# Patient Record
Sex: MALE | Race: WHITE | NOT HISPANIC OR LATINO | Employment: FULL TIME | URBAN - METROPOLITAN AREA
[De-identification: names, ages, dates, MRNs, and addresses within clinical notes are randomized per-mention and may not be internally consistent; named-entity substitution may affect disease eponyms.]

---

## 2023-03-01 PROBLEM — M17.0 PRIMARY OSTEOARTHRITIS OF BOTH KNEES: Status: ACTIVE | Noted: 2023-03-01

## 2023-03-01 PROBLEM — M25.569 JOINT PAIN, KNEE: Status: ACTIVE | Noted: 2023-03-01

## 2023-03-01 PROBLEM — M19.019 ARTHRITIS OF SHOULDER REGION: Status: ACTIVE | Noted: 2023-03-01

## 2023-03-01 PROBLEM — M25.519 SHOULDER PAIN: Status: ACTIVE | Noted: 2023-03-01

## 2023-03-01 PROBLEM — M22.42 CHONDROMALACIA OF LEFT PATELLA: Status: ACTIVE | Noted: 2023-03-01

## 2023-03-01 PROBLEM — M54.50 LOW BACK PAIN: Status: ACTIVE | Noted: 2023-03-01

## 2023-03-01 PROBLEM — M54.50 CHRONIC LOW BACK PAIN: Status: ACTIVE | Noted: 2023-03-01

## 2023-03-01 PROBLEM — M77.00 EPICONDYLITIS ELBOW, MEDIAL: Status: ACTIVE | Noted: 2023-03-01

## 2023-03-01 PROBLEM — M25.529 ELBOW PAIN: Status: ACTIVE | Noted: 2023-03-01

## 2023-03-01 PROBLEM — M79.672 LEFT FOOT PAIN: Status: ACTIVE | Noted: 2023-03-01

## 2023-03-01 PROBLEM — M25.561 KNEE PAIN, RIGHT: Status: ACTIVE | Noted: 2023-03-01

## 2023-03-01 PROBLEM — M23.90 KNEE INTERNAL DERANGEMENT: Status: ACTIVE | Noted: 2023-03-01

## 2023-03-01 PROBLEM — R13.10 DYSPHAGIA: Status: ACTIVE | Noted: 2023-03-01

## 2023-03-01 PROBLEM — M77.01 MEDIAL EPICONDYLITIS OF RIGHT ELBOW: Status: ACTIVE | Noted: 2023-03-01

## 2023-03-01 PROBLEM — R07.0 THROAT DISCOMFORT: Status: ACTIVE | Noted: 2023-03-01

## 2023-03-01 PROBLEM — G56.01 CARPAL TUNNEL SYNDROME OF RIGHT WRIST: Status: ACTIVE | Noted: 2023-03-01

## 2023-03-01 PROBLEM — K55.069 MESENTERIC VEIN THROMBOSIS (MULTI): Status: ACTIVE | Noted: 2023-03-01

## 2023-03-01 PROBLEM — M89.8X5 PAIN OF LEFT FEMUR: Status: ACTIVE | Noted: 2023-03-01

## 2023-03-01 PROBLEM — S76.302A LEFT HAMSTRING INJURY: Status: ACTIVE | Noted: 2023-03-01

## 2023-03-01 PROBLEM — S46.812A STRAIN OF LEFT LEVATOR SCAPULAE MUSCLE: Status: ACTIVE | Noted: 2023-03-01

## 2023-03-01 PROBLEM — M54.12 CERVICAL RADICULOPATHY: Status: ACTIVE | Noted: 2023-03-01

## 2023-03-01 PROBLEM — G56.21 CUBITAL TUNNEL SYNDROME ON RIGHT: Status: ACTIVE | Noted: 2023-03-01

## 2023-03-01 PROBLEM — L08.9 FINGER INFECTION: Status: ACTIVE | Noted: 2023-03-01

## 2023-03-01 PROBLEM — M25.621 STIFFNESS OF RIGHT ELBOW, NOT ELSEWHERE CLASSIFIED: Status: ACTIVE | Noted: 2023-03-01

## 2023-03-01 PROBLEM — S76.312A STRAIN OF LEFT PIRIFORMIS MUSCLE: Status: ACTIVE | Noted: 2023-03-01

## 2023-03-01 PROBLEM — G89.29 CHRONIC LOW BACK PAIN: Status: ACTIVE | Noted: 2023-03-01

## 2023-03-01 PROBLEM — F39 MOOD DISORDER (CMS-HCC): Status: ACTIVE | Noted: 2023-03-01

## 2023-03-01 PROBLEM — M25.572 LEFT ANKLE PAIN: Status: ACTIVE | Noted: 2023-03-01

## 2023-03-01 PROBLEM — M25.511 RIGHT SHOULDER PAIN: Status: ACTIVE | Noted: 2023-03-01

## 2023-03-01 PROBLEM — M25.562 LEFT KNEE PAIN: Status: ACTIVE | Noted: 2023-03-01

## 2023-03-01 PROBLEM — M54.32 SCIATICA OF LEFT SIDE: Status: ACTIVE | Noted: 2023-03-01

## 2023-03-01 PROBLEM — D68.51 HOMOZYGOUS FACTOR V LEIDEN MUTATION (MULTI): Status: ACTIVE | Noted: 2023-03-01

## 2023-03-01 PROBLEM — R09.A2 GLOBUS SENSATION: Status: ACTIVE | Noted: 2023-03-01

## 2023-03-01 PROBLEM — M25.469 EFFUSION OF KNEE: Status: ACTIVE | Noted: 2023-03-01

## 2023-03-01 PROBLEM — M25.552 LEFT HIP PAIN: Status: ACTIVE | Noted: 2023-03-01

## 2023-03-01 PROBLEM — M19.071 LOCALIZED OSTEOARTHRITIS OF ANKLE, RIGHT: Status: ACTIVE | Noted: 2023-03-01

## 2023-03-01 PROBLEM — M22.41 CHONDROMALACIA OF RIGHT PATELLA: Status: ACTIVE | Noted: 2023-03-01

## 2023-03-01 PROBLEM — I83.10 VARICOSE VEINS WITH INFLAMMATION: Status: ACTIVE | Noted: 2023-03-01

## 2023-03-01 PROBLEM — S92.355A CLOSED NONDISPLACED FRACTURE OF FIFTH LEFT METATARSAL BONE: Status: ACTIVE | Noted: 2023-03-01

## 2023-03-01 PROBLEM — S83.281A TEAR OF LATERAL MENISCUS OF RIGHT KNEE: Status: ACTIVE | Noted: 2023-03-01

## 2023-03-01 PROBLEM — M79.18 RHOMBOID PAIN: Status: ACTIVE | Noted: 2023-03-01

## 2023-03-01 RX ORDER — ESCITALOPRAM OXALATE 10 MG/1
TABLET ORAL
COMMUNITY
Start: 2022-02-25 | End: 2023-03-22 | Stop reason: ALTCHOICE

## 2023-03-01 RX ORDER — LAMOTRIGINE 150 MG/1
TABLET ORAL 2 TIMES DAILY
COMMUNITY

## 2023-03-01 RX ORDER — HYDROXYCHLOROQUINE SULFATE 200 MG/1
TABLET, FILM COATED ORAL
COMMUNITY
Start: 2022-06-27 | End: 2023-03-22 | Stop reason: ALTCHOICE

## 2023-03-01 RX ORDER — SILDENAFIL 100 MG/1
TABLET, FILM COATED ORAL
COMMUNITY
Start: 2022-08-31

## 2023-03-01 RX ORDER — WARFARIN 10 MG/1
1 TABLET ORAL DAILY
COMMUNITY
Start: 2018-08-03

## 2023-03-07 LAB
ALANINE AMINOTRANSFERASE (SGPT) (U/L) IN SER/PLAS: 14 U/L (ref 10–52)
ALBUMIN (G/DL) IN SER/PLAS: 3.9 G/DL (ref 3.4–5)
ALKALINE PHOSPHATASE (U/L) IN SER/PLAS: 31 U/L (ref 33–120)
ANION GAP IN SER/PLAS: 9 MMOL/L (ref 10–20)
APPEARANCE, URINE: NORMAL
ASCORBIC ACID: NORMAL MG/DL
ASPARTATE AMINOTRANSFERASE (SGOT) (U/L) IN SER/PLAS: 15 U/L (ref 9–39)
BASOPHILS (10*3/UL) IN BLOOD BY AUTOMATED COUNT: 0.08 X10E9/L (ref 0–0.1)
BASOPHILS/100 LEUKOCYTES IN BLOOD BY AUTOMATED COUNT: 1.1 % (ref 0–2)
BILIRUBIN TOTAL (MG/DL) IN SER/PLAS: 0.3 MG/DL (ref 0–1.2)
BILIRUBIN, URINE: NORMAL
BLOOD, URINE: NORMAL
CALCIUM (MG/DL) IN SER/PLAS: 8.7 MG/DL (ref 8.6–10.3)
CARBON DIOXIDE, TOTAL (MMOL/L) IN SER/PLAS: 29 MMOL/L (ref 21–32)
CHLORIDE (MMOL/L) IN SER/PLAS: 105 MMOL/L (ref 98–107)
CHOLESTEROL (MG/DL) IN SER/PLAS: 214 MG/DL (ref 0–199)
CHOLESTEROL IN HDL (MG/DL) IN SER/PLAS: 43.9 MG/DL
CHOLESTEROL/HDL RATIO: 4.9
COLOR, URINE: NORMAL
CREATININE (MG/DL) IN SER/PLAS: 0.85 MG/DL (ref 0.5–1.3)
EOSINOPHILS (10*3/UL) IN BLOOD BY AUTOMATED COUNT: 0.19 X10E9/L (ref 0–0.7)
EOSINOPHILS/100 LEUKOCYTES IN BLOOD BY AUTOMATED COUNT: 2.7 % (ref 0–6)
ERYTHROCYTE DISTRIBUTION WIDTH (RATIO) BY AUTOMATED COUNT: 13.8 % (ref 11.5–14.5)
ERYTHROCYTE MEAN CORPUSCULAR HEMOGLOBIN CONCENTRATION (G/DL) BY AUTOMATED: 31.9 G/DL (ref 32–36)
ERYTHROCYTE MEAN CORPUSCULAR VOLUME (FL) BY AUTOMATED COUNT: 85 FL (ref 80–100)
ERYTHROCYTES (10*6/UL) IN BLOOD BY AUTOMATED COUNT: 4.96 X10E12/L (ref 4.5–5.9)
GFR MALE: >90 ML/MIN/1.73M2
GLUCOSE (MG/DL) IN SER/PLAS: 93 MG/DL (ref 74–99)
GLUCOSE, URINE: NORMAL
HEMATOCRIT (%) IN BLOOD BY AUTOMATED COUNT: 42.3 % (ref 41–52)
HEMOGLOBIN (G/DL) IN BLOOD: 13.5 G/DL (ref 13.5–17.5)
IMMATURE GRANULOCYTES/100 LEUKOCYTES IN BLOOD BY AUTOMATED COUNT: 0.3 % (ref 0–0.9)
KETONES, URINE: NORMAL
LDL: 140 MG/DL (ref 0–99)
LEUKOCYTE ESTERASE, URINE: NORMAL
LEUKOCYTES (10*3/UL) IN BLOOD BY AUTOMATED COUNT: 7 X10E9/L (ref 4.4–11.3)
LYMPHOCYTES (10*3/UL) IN BLOOD BY AUTOMATED COUNT: 2.7 X10E9/L (ref 1.2–4.8)
LYMPHOCYTES/100 LEUKOCYTES IN BLOOD BY AUTOMATED COUNT: 38.6 % (ref 13–44)
MONOCYTES (10*3/UL) IN BLOOD BY AUTOMATED COUNT: 0.65 X10E9/L (ref 0.1–1)
MONOCYTES/100 LEUKOCYTES IN BLOOD BY AUTOMATED COUNT: 9.3 % (ref 2–10)
NEUTROPHILS (10*3/UL) IN BLOOD BY AUTOMATED COUNT: 3.35 X10E9/L (ref 1.2–7.7)
NEUTROPHILS/100 LEUKOCYTES IN BLOOD BY AUTOMATED COUNT: 48 % (ref 40–80)
NITRITE, URINE: NORMAL
PH, URINE: NORMAL
PLATELETS (10*3/UL) IN BLOOD AUTOMATED COUNT: 331 X10E9/L (ref 150–450)
POTASSIUM (MMOL/L) IN SER/PLAS: 4.1 MMOL/L (ref 3.5–5.3)
PROSTATE SPECIFIC ANTIGEN,SCREEN: 1.02 NG/ML (ref 0–4)
PROTEIN TOTAL: 6.7 G/DL (ref 6.4–8.2)
PROTEIN, URINE: NORMAL
SODIUM (MMOL/L) IN SER/PLAS: 139 MMOL/L (ref 136–145)
SPECIFIC GRAVITY, URINE: NORMAL
THYROTROPIN (MIU/L) IN SER/PLAS BY DETECTION LIMIT <= 0.05 MIU/L: 1.79 MIU/L (ref 0.44–3.98)
TRIGLYCERIDE (MG/DL) IN SER/PLAS: 149 MG/DL (ref 0–149)
UREA NITROGEN (MG/DL) IN SER/PLAS: 18 MG/DL (ref 6–23)
UROBILINOGEN, URINE: NORMAL
VLDL: 30 MG/DL (ref 0–40)

## 2023-03-20 LAB
APPEARANCE, URINE: CLEAR
BILIRUBIN, URINE: NEGATIVE
BLOOD, URINE: NEGATIVE
COLOR, URINE: YELLOW
GLUCOSE, URINE: NEGATIVE MG/DL
KETONES, URINE: NEGATIVE MG/DL
LEUKOCYTE ESTERASE, URINE: NEGATIVE
NITRITE, URINE: NEGATIVE
PH, URINE: 5 (ref 5–8)
PROTEIN, URINE: NEGATIVE MG/DL
SPECIFIC GRAVITY, URINE: 1.02 (ref 1–1.03)
UROBILINOGEN, URINE: <2 MG/DL (ref 0–1.9)

## 2023-03-22 ENCOUNTER — OFFICE VISIT (OUTPATIENT)
Dept: PRIMARY CARE | Facility: CLINIC | Age: 58
End: 2023-03-22
Payer: COMMERCIAL

## 2023-03-22 VITALS
OXYGEN SATURATION: 96 % | RESPIRATION RATE: 16 BRPM | WEIGHT: 180 LBS | HEART RATE: 54 BPM | HEIGHT: 72 IN | BODY MASS INDEX: 24.38 KG/M2

## 2023-03-22 DIAGNOSIS — K55.069 MESENTERIC VEIN THROMBOSIS (MULTI): ICD-10-CM

## 2023-03-22 DIAGNOSIS — D68.51 HOMOZYGOUS FACTOR V LEIDEN MUTATION (MULTI): ICD-10-CM

## 2023-03-22 DIAGNOSIS — R35.0 BENIGN PROSTATIC HYPERPLASIA WITH URINARY FREQUENCY: Primary | ICD-10-CM

## 2023-03-22 DIAGNOSIS — E78.9 LIPID DISORDER: ICD-10-CM

## 2023-03-22 DIAGNOSIS — N40.1 BENIGN PROSTATIC HYPERPLASIA WITH URINARY FREQUENCY: Primary | ICD-10-CM

## 2023-03-22 PROCEDURE — 99386 PREV VISIT NEW AGE 40-64: CPT | Performed by: INTERNAL MEDICINE

## 2023-03-22 RX ORDER — TAMSULOSIN HYDROCHLORIDE 0.4 MG/1
0.4 CAPSULE ORAL DAILY
Qty: 90 CAPSULE | Refills: 3 | Status: SHIPPED | OUTPATIENT
Start: 2023-03-22 | End: 2024-03-21

## 2023-03-22 RX ORDER — ROSUVASTATIN CALCIUM 5 MG/1
5 TABLET, COATED ORAL DAILY
Qty: 30 TABLET | Refills: 5 | Status: SHIPPED | OUTPATIENT
Start: 2023-03-22 | End: 2023-09-18

## 2023-03-22 ASSESSMENT — ENCOUNTER SYMPTOMS
NEUROLOGICAL NEGATIVE: 1
LOSS OF SENSATION IN FEET: 0
EYES NEGATIVE: 1
HEMATOLOGIC/LYMPHATIC NEGATIVE: 1
RESPIRATORY NEGATIVE: 1
CARDIOVASCULAR NEGATIVE: 1
ENDOCRINE NEGATIVE: 1
GASTROINTESTINAL NEGATIVE: 1
DEPRESSION: 0
ALLERGIC/IMMUNOLOGIC NEGATIVE: 1
OCCASIONAL FEELINGS OF UNSTEADINESS: 0
PSYCHIATRIC NEGATIVE: 1
CONSTITUTIONAL NEGATIVE: 1

## 2023-03-22 NOTE — PROGRESS NOTES
Subjective   Patient ID: Sukhjinder Cedeno is a 57 y.o. male who presents for Arthritis and Annual Exam.    Arthritis         Mesenteric thrombosis    HLD    Elevated CT-Cardiac score      Review of Systems   Constitutional: Negative.    HENT: Negative.     Eyes: Negative.    Respiratory: Negative.     Cardiovascular: Negative.    Gastrointestinal: Negative.    Endocrine: Negative.    Genitourinary: Negative.    Musculoskeletal:  Positive for arthritis.   Skin: Negative.    Allergic/Immunologic: Negative.    Neurological: Negative.    Hematological: Negative.    Psychiatric/Behavioral: Negative.         Objective   Pulse 54   Resp 16   Ht 1.829 m (6')   Wt 81.6 kg (180 lb)   SpO2 96%   BMI 24.41 kg/m²     Physical Exam  Constitutional:       Appearance: Normal appearance.   HENT:      Head: Normocephalic and atraumatic.      Right Ear: Tympanic membrane, ear canal and external ear normal.      Left Ear: Tympanic membrane, ear canal and external ear normal.      Nose: Nose normal.      Mouth/Throat:      Mouth: Mucous membranes are moist.   Eyes:      Extraocular Movements: Extraocular movements intact.      Conjunctiva/sclera: Conjunctivae normal.      Pupils: Pupils are equal, round, and reactive to light.   Cardiovascular:      Rate and Rhythm: Normal rate and regular rhythm.      Pulses: Normal pulses.      Heart sounds: Normal heart sounds.   Pulmonary:      Effort: Pulmonary effort is normal.      Breath sounds: Normal breath sounds.   Abdominal:      General: Abdomen is flat. Bowel sounds are normal.      Palpations: Abdomen is soft.   Genitourinary:     Rectum: Normal.   Musculoskeletal:         General: Normal range of motion.      Cervical back: Normal range of motion.   Skin:     General: Skin is warm.   Neurological:      General: No focal deficit present.      Mental Status: He is alert and oriented to person, place, and time.   Psychiatric:         Mood and Affect: Mood normal.         Behavior:  Behavior normal.         Assessment/Plan        Mesenteric thrombosis    HLD    Elevated CT-Cardiac score    Mood disorder    BPH    Plan:    Start Livalo 1 mg daily    Psych consult re medication management    Start Flomax 0.4 mg daily    ? Urology consult    Continue with Current treatment    Follow up in 3 months/PRN

## 2023-12-12 ENCOUNTER — OFFICE VISIT (OUTPATIENT)
Dept: DERMATOLOGY | Facility: CLINIC | Age: 58
End: 2023-12-12
Payer: COMMERCIAL

## 2023-12-12 DIAGNOSIS — L57.0 ACTINIC KERATOSIS: Primary | ICD-10-CM

## 2023-12-12 DIAGNOSIS — L90.5 SCAR CONDITIONS AND FIBROSIS OF SKIN: ICD-10-CM

## 2023-12-12 PROCEDURE — 17000 DESTRUCT PREMALG LESION: CPT | Performed by: STUDENT IN AN ORGANIZED HEALTH CARE EDUCATION/TRAINING PROGRAM

## 2023-12-12 PROCEDURE — 99213 OFFICE O/P EST LOW 20 MIN: CPT | Performed by: STUDENT IN AN ORGANIZED HEALTH CARE EDUCATION/TRAINING PROGRAM

## 2023-12-12 NOTE — PROGRESS NOTES
Subjective     Sukhjinder Cedeno is a 58 y.o. male who presents for the following: Wound Check and Suspicious Skin Lesion (scalp). Patient states he has a spot that bleeds every few months when he rubs with a towel.    Review of Systems:  No other skin or systemic complaints other than what is documented elsewhere in the note.    The following portions of the chart were reviewed this encounter and updated as appropriate:          Skin Cancer History  No skin cancer on file.      Specialty Problems    None       Objective   Well appearing patient in no apparent distress; mood and affect are within normal limits.    A focused skin examination was performed. All findings within normal limits unless otherwise noted below.    Assessment/Plan   1. Actinic keratosis  Right Frontal Scalp  Erythematous macule with gritty scale.    Destr of lesion - Right Frontal Scalp  Complexity: simple    Destruction method: cryotherapy    Informed consent: discussed and consent obtained    Lesion destroyed using liquid nitrogen: Yes    Cryotherapy cycles:  1  Outcome: patient tolerated procedure well with no complications    Post-procedure details: wound care instructions given      2. Scar conditions and fibrosis of skin  Mid Frontal Scalp  Small scab today, patient showed image with small erosion in the area which now healed over    Favor friable skin due to sun damage causing fragile skin in the area  -Reassured that there are no concerning features   - We recommend daily use of broad spectrum (UVA + UVB protection) sunscreen , with an SPF of 30 or higher. Reapply sunscreen frequently (at least every 2 hours), especially when outdoors or when active, and apply more than you think you need. Waterproof or sweatproof suscreens are important if you will be spending time swimming or doing strenuous activites outside. In addition, we recommend avoidance of mid-day sun, during the hours of 10am to 3pm, and seeking shade when possible. The use of  sun protective clothing, such as a wide-brimmed hat, is also recommended.  -Follow up in 6 months for FBSE      I was present during all key portions of visit including history, exam, discussion/plan and/or procedures and directly supervised our resident during all portions of the visit, follow up care, medications and more    Clem Gar MD

## 2024-05-28 ENCOUNTER — OFFICE VISIT (OUTPATIENT)
Dept: OPHTHALMOLOGY | Facility: CLINIC | Age: 59
End: 2024-05-28
Payer: COMMERCIAL

## 2024-05-28 DIAGNOSIS — H52.213 IRREGULAR ASTIGMATISM OF BOTH EYES: Primary | ICD-10-CM

## 2024-05-28 DIAGNOSIS — Z98.890 HISTORY OF RADIAL KERATOTOMY: ICD-10-CM

## 2024-05-28 DIAGNOSIS — H52.31 ANISOMETROPIA: ICD-10-CM

## 2024-05-28 LAB
CENTRAL CORNEA THICKNESS (OD): 552 MICRONS
CENTRAL CORNEA THICKNESS (OS): 487 MICRONS
KMAX (OD): 51.7 DIOPTERS
KMAX (OS): 60.4 DIOPTERS
SIMK FLAT (OD): 34.9 DIOPTERS
SIMK FLAT (OS): 31.9 DIOPTERS
SIMK STEEP (OD): 37.7 DIOPTERS
SIMK STEEP (OS): 34.4 DIOPTERS

## 2024-05-28 PROCEDURE — 99202 OFFICE O/P NEW SF 15 MIN: CPT | Performed by: OPTOMETRIST

## 2024-05-28 PROCEDURE — 92025 CPTRIZED CORNEAL TOPOGRAPHY: CPT | Performed by: OPTOMETRIST

## 2024-05-28 PROCEDURE — 92310 CONTACT LENS FITTING OU: CPT | Performed by: OPTOMETRIST

## 2024-05-28 PROCEDURE — 92015 DETERMINE REFRACTIVE STATE: CPT | Performed by: OPTOMETRIST

## 2024-05-28 PROCEDURE — V2531 CONTACT LENS GAS PERMEABLE: HCPCS | Performed by: OPTOMETRIST

## 2024-05-28 RX ORDER — BUPROPION HYDROCHLORIDE 300 MG/1
300 TABLET ORAL DAILY
COMMUNITY
Start: 2024-02-14

## 2024-05-28 ASSESSMENT — REFRACTION_CURRENTRX
OD_BASECURVE: 8.4
OS_BASECURVE: 8.4
OS_ADDL_SPECS: 3400 36-42
OD_SPHERE: -3.50
OS_ADDL_SPECS: 3200
OS_BRAND: SYNERGEYES
OS_DIAMETER: 16.0
OS_AXIS: 155
OD_SPHERE: -9.50
OS_CYLINDER: -2.00
OD_ADDL_SPECS: 3400 36-42
OD_BRAND: SYNERGEYES VS
OS_BRAND: SYNERGEYES VS
OS_SPHERE: +0.25
OS_SPHERE: -1.75
OS_BRAND: SYNERGEYES
OS_SPHERE: PLANO
OD_ADDL_SPECS: 3400 36-42
OS_SPHERE: PLANO
OS_BASECURVE: 8.4
OS_ADDL_SPECS: 3400 36-42
OD_SPHERE: PLANO
OS_BASECURVE: 8.4
OD_DIAMETER: 15.5
OS_CYLINDER: -1.00
OS_DIAMETER: 16.0
OD_BASECURVE: 8.4
OS_DIAMETER: 15.5
OD_BRAND: SYNERGEYES
OD_DIAMETER: 16.0

## 2024-05-28 ASSESSMENT — REFRACTION_MANIFEST
OD_CYLINDER: -3.00
OS_AXIS: 135
OS_SPHERE: +11.25
OD_SPHERE: +1.75
OS_AXIS: 120
OD_SPHERE: +2.25
OD_AXIS: 070
OD_CYLINDER: -3.00
OD_AXIS: 060
OS_SPHERE: +5.50
OD_ADD: +2.00
METHOD_AUTOREFRACTION: 1
OS_ADD: +2.00
OS_CYLINDER: -6.00
OS_CYLINDER: -3.00

## 2024-05-28 ASSESSMENT — CONF VISUAL FIELD
OS_SUPERIOR_TEMPORAL_RESTRICTION: 0
OD_NORMAL: 1
OS_INFERIOR_NASAL_RESTRICTION: 0
OS_INFERIOR_TEMPORAL_RESTRICTION: 0
OD_SUPERIOR_TEMPORAL_RESTRICTION: 0
OS_NORMAL: 1
METHOD: COUNTING FINGERS
OD_INFERIOR_TEMPORAL_RESTRICTION: 0
OD_SUPERIOR_NASAL_RESTRICTION: 0
OD_INFERIOR_NASAL_RESTRICTION: 0
OS_SUPERIOR_NASAL_RESTRICTION: 0

## 2024-05-28 ASSESSMENT — ENCOUNTER SYMPTOMS
RESPIRATORY NEGATIVE: 0
GASTROINTESTINAL NEGATIVE: 0
PSYCHIATRIC NEGATIVE: 0
ALLERGIC/IMMUNOLOGIC NEGATIVE: 0
NEUROLOGICAL NEGATIVE: 0
CARDIOVASCULAR NEGATIVE: 0
ENDOCRINE NEGATIVE: 0
MUSCULOSKELETAL NEGATIVE: 0
HEMATOLOGIC/LYMPHATIC NEGATIVE: 0
CONSTITUTIONAL NEGATIVE: 0
EYES NEGATIVE: 1

## 2024-05-28 ASSESSMENT — REFRACTION_WEARINGRX
OS_CYLINDER: -3.00
OD_AXIS: 060
SPECS_TYPE: PALS
OD_CYLINDER: -3.00
OS_AXIS: 120
OS_ADD: +2.00
OD_ADD: +2.00
OS_SPHERE: +5.50
OD_SPHERE: +2.25

## 2024-05-28 ASSESSMENT — VISUAL ACUITY
OD_CC: 20/20
OS_CC: 20/25
VA_OR_OS_CURRENT_RX: 20/20-3
VA_OR_OS_CURRENT_RX: 20/20-1
OD_CC: 20/20
CORRECTION_TYPE: GLASSES
VA_OR_OD_CURRENT_RX: 20/20
VA_OR_OD_CURRENT_RX: 20/20
METHOD: SNELLEN - LINEAR
OD_CC+: -1
VA_OR_OS_CURRENT_RX: 20/20-2
OS_CC+: -2

## 2024-05-28 ASSESSMENT — SLIT LAMP EXAM - LIDS
COMMENTS: CLEAN AND CLEAR
COMMENTS: CLEAN AND CLEAR

## 2024-05-28 ASSESSMENT — EXTERNAL EXAM - RIGHT EYE: OD_EXAM: NORMAL

## 2024-05-28 ASSESSMENT — EXTERNAL EXAM - LEFT EYE: OS_EXAM: NORMAL

## 2024-05-28 NOTE — Clinical Note
Both eyes (OU) Contact Lens Order  Quantity: 1 Package: RGP Appointment needed? Yes Medically necessary? Yes Ship To: [facility] Additional instructions: order [id number] Return to check when in

## 2024-05-28 NOTE — PROGRESS NOTES
Assessment/Plan   Diagnoses and all orders for this visit:  Irregular astigmatism of both eyes  -     Corneal Topography - OU - Both Eyes  History of radial keratotomy  Anisometropia   - new patient to clinic, Hx of Scleral lens wear but unknown brand/parameters so new fit completed today  - extreme anisometropia seen on autorefraction  - extreme thinning seen on topography (post radial keratotomy)  - successful scleral fitting today visual acuity (VA) 20/20 OD & OS, compared to 20/20 OD, and 20/25 OS in glasses.  - patient educated on today's findings  - new scleral lens order placed and patient will be contacted once the lenses arrive to clinic to return for lens pickup appointment    RTC once lenses arrive  DFE and intraocular pressure (IOP) on a Follow up visit

## 2024-06-11 ENCOUNTER — APPOINTMENT (OUTPATIENT)
Dept: DERMATOLOGY | Facility: CLINIC | Age: 59
End: 2024-06-11
Payer: COMMERCIAL

## 2024-06-11 DIAGNOSIS — L90.5 SCAR CONDITIONS AND FIBROSIS OF SKIN: ICD-10-CM

## 2024-06-11 DIAGNOSIS — L57.0 ACTINIC KERATOSIS: ICD-10-CM

## 2024-06-11 DIAGNOSIS — L57.8 SUN-DAMAGED SKIN: Primary | ICD-10-CM

## 2024-06-11 DIAGNOSIS — L82.1 SEBORRHEIC KERATOSES: ICD-10-CM

## 2024-06-11 DIAGNOSIS — D22.9 MULTIPLE BENIGN MELANOCYTIC NEVI: ICD-10-CM

## 2024-06-11 DIAGNOSIS — D18.01 CHERRY ANGIOMA: ICD-10-CM

## 2024-06-11 PROCEDURE — 99213 OFFICE O/P EST LOW 20 MIN: CPT | Performed by: STUDENT IN AN ORGANIZED HEALTH CARE EDUCATION/TRAINING PROGRAM

## 2024-06-11 ASSESSMENT — DERMATOLOGY QUALITY OF LIFE (QOL) ASSESSMENT
DATE THE QUALITY-OF-LIFE ASSESSMENT WAS COMPLETED: 67002
ARE THERE EXCLUSIONS OR EXCEPTIONS FOR THE QUALITY OF LIFE ASSESSMENT: NO
RATE HOW BOTHERED YOU ARE BY SYMPTOMS OF YOUR SKIN PROBLEM (EG, ITCHING, STINGING BURNING, HURTING OR SKIN IRRITATION): 0 - NEVER BOTHERED
RATE HOW BOTHERED YOU ARE BY EFFECTS OF YOUR SKIN PROBLEMS ON YOUR ACTIVITIES (EG, GOING OUT, ACCOMPLISHING WHAT YOU WANT, WORK ACTIVITIES OR YOUR RELATIONSHIPS WITH OTHERS): 0 - NEVER BOTHERED
RATE HOW EMOTIONALLY BOTHERED YOU ARE BY YOUR SKIN PROBLEM (FOR EXAMPLE, WORRY, EMBARRASSMENT, FRUSTRATION): 0 - NEVER BOTHERED

## 2024-06-11 ASSESSMENT — PATIENT GLOBAL ASSESSMENT (PGA): PATIENT GLOBAL ASSESSMENT: PATIENT GLOBAL ASSESSMENT:  1 - CLEAR

## 2024-06-11 ASSESSMENT — ITCH NUMERIC RATING SCALE: HOW SEVERE IS YOUR ITCHING?: 0

## 2024-06-11 ASSESSMENT — DERMATOLOGY PATIENT ASSESSMENT: DO YOU HAVE ANY NEW OR CHANGING LESIONS: NO

## 2024-06-11 NOTE — PROGRESS NOTES
Subjective     Sukhjinder Cedeno is a 59 y.o. male who presents for the following: Skin Check.     Review of Systems:  No other skin or systemic complaints other than what is documented elsewhere in the note.    The following portions of the chart were reviewed this encounter and updated as appropriate:  Tobacco  Allergies  Meds  Problems  Med Hx  Surg Hx         Skin Cancer History  No skin cancer on file.      Specialty Problems    None       Objective   Well appearing patient in no apparent distress; mood and affect are within normal limits.    A full examination was performed including scalp, head, eyes, ears, nose, lips, neck, chest, axillae, abdomen, back, buttocks, bilateral upper extremities, bilateral lower extremities, hands, feet, fingers, toes, fingernails, and toenails. All findings within normal limits unless otherwise noted below.    Assessment/Plan   1. Sun-damaged skin  - scattered tan macules, telangiectasias, and general photo-damage    Actinically damaged skin-  - Sun protective behavior reviewed and encouraged including the use of over-the-counter sunscreen with SPF30+ daily (reapply every 1.5 hours when outdoors), UPF clothing, broad rimmed hats, sunglasses, and avoidance of midday sun. Home skin monitoring encouraged and how to monitor for skin cancer (changing or new moles, new rapidly growing or non-healing lesions) reviewed. Patient encouraged to call with interval concerns or changes.        2. Scar conditions and fibrosis of skin  Mid Back  - Well healed scar at prior treatment sites without visual or palpable evidence of recurrence.     - Well healed scar(s) at sites of prior skin cancer and/or dysplastic nevi.  - Sun protective behavior reviewed and encouraged including the use of over-the-counter sunscreen with SPF30+ daily (reapply every 1.5 hours when outdoors), UPF clothing, broad rimmed hats, sunglasses, and avoidance of midday sun. Home skin monitoring encouraged and how to  monitor for skin cancer (changing or new moles, new rapidly growing or non-healing lesions) reviewed. Patient encouraged to call with interval concerns or changes.     3. Cherry angioma  - scattered small bright red papules and macules    Cherry angioma(s)  - Discussed benign nature and that no treatment is necessary unless it becomes painful or increases in size. Patient opts for clinical monitoring at this time.      4. Seborrheic keratoses  - Scattered waxy tan/grey/brown papules with horn cysts    Seborrheic keratosis (-es)  - Discussed benign nature and that no treatment is necessary unless it becomes painful or increases in size. Patient opts for clinical monitoring at this time.      5. Multiple benign melanocytic nevi  - scattered regular brown macules and papules    Benign melanocytic nevi  - Discussed benign nature and that no treatment is necessary unless it becomes painful or increases in size. Patient opts for clinical monitoring at this time.    - Sun protective behavior reviewed and encouraged including the use of over-the-counter sunscreen with SPF30+ daily (reapply every 1.5 hours when outdoors), UPF clothing, broad rimmed hats, sunglasses, and avoidance of midday sun. Home skin monitoring encouraged and how to monitor for skin cancer (changing or new moles, new rapidly growing or non-healing lesions) reviewed. Patient encouraged to call with interval concerns or changes.      Follow up in 6 months to one year for NATHANAEL Avila DO, MPH  PGY-4, Dept. of Dermatology    I was present during all key portions of visit including history, exam, discussion/plan and/or procedures and directly supervised our resident during all portions of the visit, follow up care, medications and more    Clem Gar MD

## 2024-07-02 ENCOUNTER — APPOINTMENT (OUTPATIENT)
Dept: OPHTHALMOLOGY | Facility: CLINIC | Age: 59
End: 2024-07-02
Payer: COMMERCIAL

## 2024-07-02 DIAGNOSIS — H52.213 IRREGULAR ASTIGMATISM OF BOTH EYES: ICD-10-CM

## 2024-07-02 DIAGNOSIS — Z98.890 HISTORY OF RADIAL KERATOTOMY: Primary | ICD-10-CM

## 2024-07-02 PROCEDURE — 99070 (U160 LACRIPURE (U16): Performed by: OPTOMETRIST

## 2024-07-02 PROCEDURE — 92014 COMPRE OPH EXAM EST PT 1/>: CPT | Performed by: OPTOMETRIST

## 2024-07-02 ASSESSMENT — ENCOUNTER SYMPTOMS
EYES NEGATIVE: 0
ALLERGIC/IMMUNOLOGIC NEGATIVE: 0
CONSTITUTIONAL NEGATIVE: 0
RESPIRATORY NEGATIVE: 0
GASTROINTESTINAL NEGATIVE: 0
NEUROLOGICAL NEGATIVE: 0
CARDIOVASCULAR NEGATIVE: 0
HEMATOLOGIC/LYMPHATIC NEGATIVE: 0
MUSCULOSKELETAL NEGATIVE: 0
ENDOCRINE NEGATIVE: 0
PSYCHIATRIC NEGATIVE: 0

## 2024-07-02 ASSESSMENT — REFRACTION_CURRENTRX
OD_CYLINDER: SPHERE
OS_DIAMETER: 15.5
OD_BASECURVE: 8.4
OD_DIAMETER: 15.5
OD_SPHERE: -3.50
OD_BRAND: SYNERGEYES VS
OD_ADDL_SPECS: 3400 36-42
OS_SPHERE: +0.25
OS_ADDL_SPECS: 3400 36-42
OS_AXIS: 155
OS_BASECURVE: 8.4
OS_CYLINDER: -1.00
OS_BRAND: SYNERGEYES VS

## 2024-07-02 ASSESSMENT — EXTERNAL EXAM - LEFT EYE: OS_EXAM: NORMAL

## 2024-07-02 ASSESSMENT — VISUAL ACUITY
VA_OR_OD_CURRENT_RX: 20/20
OS_CC: 20/25
METHOD: SNELLEN - LINEAR
CORRECTION_TYPE: GLASSES
OD_CC: 20/20
VA_OR_OS_CURRENT_RX: 20/20

## 2024-07-02 ASSESSMENT — SLIT LAMP EXAM - LIDS
COMMENTS: CLEAN AND CLEAR
COMMENTS: CLEAN AND CLEAR

## 2024-07-02 ASSESSMENT — TONOMETRY
IOP_METHOD: GOLDMANN APPLANATION
OS_IOP_MMHG: 14
OD_IOP_MMHG: 16

## 2024-07-02 ASSESSMENT — CUP TO DISC RATIO
OS_RATIO: 0.3
OD_RATIO: 0.3

## 2024-07-02 ASSESSMENT — EXTERNAL EXAM - RIGHT EYE: OD_EXAM: NORMAL

## 2024-07-02 NOTE — PATIENT INSTRUCTIONS
Dr. Myra Lucas        Appointments:   [phone]  Contact Lens Orders:  [phone]       GAS PERMEABLE CONTACT LENSES  CARE AND GENERAL GUIDELINES  General Contact Lens Guidelines    Any time a contact lens is removed from the eye, it must be cleaned and disinfected before being reinserted    Always wash hands before handling contact lenses.  Avoid soaps containing additives such as lotions, creams, oils or perfumes. Anti-bacterial soaps are recommended    Whenever lenses have been removed from the case, discard the solution, rinse the case vigorously with saline or disinfecting solution, wipe with clean, dry tissue and allow it to air dry upside down.      Replace lens case monthly.  It is critical to keep your lens case CLEAN!     Routine replacement of the contact lens case can help to reduce the risk of contact lens contamination    Use only commercially prepared saline and cleaning solutions, never use homemade or generic saline    Never reuse solutions after one cleaning/disinfection cycle    Never use saliva or water to moisten a contact lens, never put a contact lens in your mouth.  Doing any of these may lead to an eye infection    Patients should always check with us before changing solutions    Contact lenses should never be stored in non-sterile fluids such as distilled water, tap water, bottled water or home purified water    Lenses or cases should not be rinsed in tap water.  Traditional contact lens approved saline solutions may be used to rinse  off lenses or if needed prior to insertion.    Lens lubricating/rewetting drops can be placed directly into the eye while contact lenses are being worn to increase lens wearing comfort    Do not sleep in you lenses unless you have been fit with lenses specifically designed for extended-wear and your doctor has approved them for  that purpose    Avoid swimming pools and hot tubs while wearing your lenses    All contact lens wearers, with few exceptions, need a pair of spectacles for emergencies and for rest from contact lenses    Contact lens wearers should have an eye exam annually or sooner, if indicated by your doctor    Cosmetics:     Apply makeup after inserting contacts and remove contact lenses before removing makeup.  This will help prevent transfer of these substances from your hands to the lens surface.    Use a good quality, water soluble mascara rather than waterproof or water-resistant types.  Replace old mascara every three months as it may become contaminated and cause infection.    Avoid aerosol sprays when your contacts are in, or remember to shield your eyes.  Walk away from the area where the spray was used since a mist of spray often lingers in the air.  If possible, use hairspray before inserting your contact lenses.    Adaptation:    Complete adaptation to contact lenses normally takes from 2-6 weeks, and some patients may take up to 12 weeks.  Normal adaptation symptoms include:    A sensation that feels much like a small eyelash is in the eye.  This feeling gradually disappears    Vision may be a little watery and may change as you blink    You may notice mild redness, tearing or itching of the eyes    Awareness of lens movement or increased blinking may be noticed.  It is important that you continue to blink fully and completely    You may be sensitive to bright light.  This sensitivity will lessen, but a good non-prescription pair of sunglasses will often provide the greatest comfort outdoors    You may experience halos or edge awareness while adapting    Some patients have temporarily improved uncorrected vision when they remove their lenses    You may notice variable vision when you remove your lenses.  This may include increased blurred vision with no glasses or with your habitual glasses, which lasts from 30  minutes to up to 4 hours after rigid lenses are removed.  This effect is greatest with patients that have had corneal surgery.    Signs of Caution: If you are ever unsure about whether what you are experiencing is part of normal adaptation, please call your doctor.    Persistent or severe redness  Continued or excessive tearing  Extreme light sensitivity  Inability to open eyes   Pain      REMEMBER: If in DOUBT, take your lenses OUT!            Lens Wear:    We have recommended a schedule for wearing the lenses during the adaptation period.  This consists of slowly increasing the wearing time so your eyes adapt properly to the lenses.  How rapidly the wearing time increases depends upon the type of lens being worn and the specific characteristics of your eyes.  Follow the wearing schedule below unless your doctor tells you otherwise:    Day One: 4 hours  Day Two: 5 hours  Day Three: 6 hours  Day Four: 7 hours  Day Five: 8 hours  Day Six: 9 hours  Day Seven: 10 hours    After Day Seven you will remain at 10 hours of wearing time maximum until your scheduled follow-up appointment.    REWETTING DROPS FOR COMFORT:    *Acceptable rigid gas permeable rewetting drops:    Cyril Rewetting Drops   Refresh Relieva for Contacts Rewetting Drops   Blink for Contacts Rewetting Drops                *We do not recommend artificial tears to be used while the lenses are worn.    NOTE for Saline Rinses:     You may purchase a commercially available saline such as Bausch & Lomb Sensitive Eyes Saline in a drug store, or a single use non-preserved saline solution such as Lacripure (by Men[employer]) or ScleralFil (by Bausch & Lomb) available at our office or on Amazon    DO NOT STORE LENSES OVERNIGHT IN SALINE SOLUTION, IT IS USED FOR RINSES ONLY      FOR TRADITIONAL RIGID GAS PERMEABLE CONTACT LENSES  (These are the smaller rigid lens types that sit directly on your cornea)    INSERTION  o Wash hands with lotion free soap and DRY HANDS.   o  Place lens in the palm of your hand.   o Rinse lens (if needed) and drain excess solution by cupping your hand.   o Place lens on DRY index finger of dominant hand  o Use your middle finger of the non-dominant hand to hold up your upper lashes and your      other middle finger to pull your lower lid down.   o Place lens directly on the colored part of your eye. Pull your index finger away and slowly release your lids     [place] and UNIQUE pH USERS: If the lens has been soaking overnight, you may insert the lens directly onto your eye in the morning with the [place] or Unique pH conditioning solution still on the lens, or you may rinse the lens in fresh rewetting/conditioning solution and then place it directly onto the eye.    CLEAR CARE USERS: If the lens has been fully neutralized, we suggest another rinse with fresh saline or a conditioning solution ([place] or Unique pH) prior to insertion     REMOVAL  BLINK METHOD  Tilt your head down while looking into a mirror flat on a table.    Cup your hand under the eye to catch the lens as it is removed from the eye.  Open your eyes wide and pull tightly (pull out and up) at the outer corner of your eye.  Blink hard.  The lens should pop out    TWO HAND METHOD  Tilt your head down while looking into a mirror flat on a table.  Place the index finger of one hand on the upper eyelid directly next to the lash line and above the contact lens.  Place the index finger of the other hand similarly on the lower lid, but below the contact lens.  (You must be very close to your lash line!  Do not invert your lids to show the inner pink portion underneath!)  Gently press the lids against the white part of the eye.  Maintain pressure against the eye and gently bring the upper and lower lids together to squeeze the lens out.  The lens should pop out.    CLEANING ….If cleaning lenses over a sink make sure that the drain is plugged!!!    IF USING B[place]ON ORIGINAL OR BOSTON ADVANCE  Remove  lens and place in palm of hand  Place 2 drops of daily  on lens (maroon cap, red tip)   Rub lens gently for 15 seconds  Rinse lens with saline  Place lens in contact lens case with fresh conditioning solution (blue cap, white tip) overnight (at least 6 hours)    IF USING B[place] SIMPLUS or UNIQUE pH**  Remove lens and place in palm of hand  Place 2 drops of [place] Simplus or Unique pH solution on lens  Rub lens gently for 15 seconds, rinse with saline  Place lens in contact lens case with fresh [place] Simplus or Unique pH solution overnight (at least 6 hours)      IF USING CLEAR CARE    Rub lenses with Clear Care solution (watch touching eyes after this because this is hydrogen peroxide and if it touches your eye directly it will sting!0  Rinse lenses with saline solution  Place lenses in appropriate side of basket marked right or left.  Fill clear vial to the line with Clear Care disinfecting solution.  Place contacts in disinfecting solution (foaming will occur, don’t be alarmed if vial overflows) for at least 6 hours  The disk neutralizes the disinfecting solution after 6 hours  Rinse the contacts with a commercially prepared saline prior to insertion and a RGP approved rewetting or conditioning solution may be used to insert  The container and disk must be discarded whenever you purchase new /rinse and disinfectant.    Not changing/discarding the disk will result in traces of disinfectant on the lenses even after a saline rinse.  You would then experience mild burning and redness      NOTE for Saline Rinses:     You may purchase a commercially available saline such as Bausch & Lomb Sensitive Eyes Saline in a drug store, or a single use non-preserved saline solution such as Lacripure (by [employer]) or ScleralFil (by Bausch & Lomb) available at our office or on Amazon    DO NOT STORE LENSES OVERNIGHT IN SALINE SOLUTION, IT IS USED FOR RINSES ONLY    IF YOUR DOCTOR HAS SUGGESTED USING PROGENT  TREATMENTS:    If using Progent, it is very important to follow the instruction in the package;    Place lenses in the proper side (R/L) on the cap of the case  Mix solution A and B at the same time into the case  Put cap on case and shake vigorously for 1 minute  Leave on countertop for ONLY 30 minutes  When 30 minutes is complete uncap the case and throw the solution directly down the drain followed by water  Rinse lenses thoroughly with Saline Solution  DISINFECT and STORE lenses overnight in your prescribed lens care solution as listed above prior to insertion  You can buy PROGENT through our office or [email or web address] with the following code for the  [facility] [id number]    IF YOUR LENS IS COATED WITH HYDRAPEG THE ONLY SOLUTIONS YOU CAN USE ARE…   o [place] Simplus   o Unique Ph   o Clear Care    * do not use additional abrasive (like [place] [red top] ) or alcohol based  with either option   * Progent will remove the HydraPeg Coating       IF YOU WEAR….PIGGYBACK CONTACT LENSES    *Insert soft contact lens first!    INSERTING SOFT CONTACT LENSES    Wash hands with lotion free soap and DRY HANDS.  Place lens in the palm of your hand.  Rinse lens and drain excess solution by cupping your hand.  Place lens on DRY index finger  Lens should look like a bowl with no sides touching your finger  Use your middle finger to hold up your upper lashes and your other middle finger to pull your lower lid down.  Place lens directly on the colored part of your eye.  Pull your index finger away and while continuing to hold your lids look up, down, left and right.   Once both soft lenses are in place rigid gas permeable lens right on top of it  Follow steps for insertion of rigid gas permeable lenses     REMOVING SOFT CONTACT LENSES  *Remove the rigid lens using the above instructions of rigid gas permeable lenses   *Once your rigid contact lens is removed you may remove your soft contact  lens.  Hands should already be washed with lotion free soap and dried  Use your middle finger to hold up your upper lashes and your other middle finger to pull your lower lid down.  Using your thumb and index finger pinch the edge of the lens and pull it off your eye.  Follow the recommended cleaning and storage procedures (if not a daily disposable soft lens) and repeat for the other eye.    CLEANING… OF SOFT & RIGID PIGGYBACK CONTACT LENSES  *Clean and store the rigid lens as listed in this packet. Many soft lenses worn as piggybacks are now daily disposables and do not require cleaning, if soft lenses are not daily disposables follow the instructions below.     VERY IMPORTANT: When a rigid lens is placed atop the soft lens, it MUST be rinsed with saline or multipurpose soft lens solutions prior to inserting the rigid lens.  That is, no not insert the RGP lens on top of the soft with rigid lens solutions such as Vista, Unique Ph or Optimum.     SOFT CONTACT LENSES:  Follow these steps for cleaning and storing the soft lens (unless they daily disposable, those are discarded nightly and not re-cleaned).  Once lens is removed place it in palm of hand  Place 5 drops of soft lens multipurpose solution on lens and gently rub for 15 seconds  Rinse lenses using soft lens multipurpose solution  Place lens in case filled with fresh soft multipurpose lens solution  Soak lenses 6 hours to disinfect   Remove lenses from case and rinse with fresh soft multipurpose solution prior to insertion  Discard used solution from case and wipe case dry until next use       Recommended Soft Contact Lens Multipurpose Solutions:     Optifree Express   Optifree Replenish   Optifree PureMoist   BioTrue   Renita   Revitalens      Note: Your doctor may recommend another soft lens disinfecting solution; If Clear Care is recommended follow the instructions for Clear Care above.      IF YOU WEAR….SYNERGEYES (HYBRID) CONTACT LENSES   See this website  for video instructions on SynergEyes Care and Handling: [email or web address]    INSERTING SYNERGEYES LENSES:     TWO FINGER METHOD  Place lens concave side up in between your index and middle finger  Fill lens to the top with unit dose non-preserved saline (Lacripure or ScleralFil preferred)  Place head parallel with floor and hold lids open   Gently push lens up to eye  Hold for 5 seconds then remove fingers from lens    TRIPOD METHOD  Place lens concave side up balancing between your thumb, middle and index fingers to form a tripod to balance on  Fill lens to the top with unit dose non-preserved saline (Lacripure or ScleralFil preferred)  Place head parallel with floor and hold lids open   Gently push lens up to eye  Hold for 5 seconds then fingers from lens     PLUNGER METHOD  Place lens concave side up on large plunger-thornton  Fill lens to the top with unit dose non-preserved saline (Lacripure or ScleralFil preferred)  Place head parallel with floor and hold lids open   Gently push lens and plunger up to eye  Hold for 5 seconds then remove plunger-thornton    REMOVING SYNERGEYES LENSES:   Place your left middle finger to hold up your upper lashes and your right middle finger to pull your lower lid down  Looking straight ahead or slightly up, use the pads of your thumb and index finger to pinch the lower edge of soft skirt and pull it off your eye  Using a narrow pinch and VERY dry hands will make removal easier  Do not use a plunger to remove SynergEyes lenses    CLEANING… If cleaning lenses over a sink make sure that the drain is plugged!!!  *BioTrue, Optifree Express, or ClearCare are recommended for use with Synergeyes lenses.    IF USING OPTIFREE EXPRESS OR BIOTRUE  Place lens concave side up on palm of hand  Gently rub lens for 5 seconds with multipurpose solution  Rinse thoroughly with multipurpose solution  Place in contact lens case with fresh multipurpose solution for 6  hours    IF USING CLEAR CARE  Rub lenses with Clear Care solution (watch touching eyes after this because this is hydrogen peroxide and if it touches your eye directly it will sting!)  Rinse lenses with saline solution  Place lenses in appropriate side of basket marked right or left.  Fill clear vial to the line with Clear Care disinfecting solution.  Place contacts in disinfecting solution (foaming will occur, don’t be alarmed if vial overflows) for at least 6 hours  The disk neutralizes the disinfecting solution after 6 hours  Rinse the contacts with a commercially prepared saline prior to insertion and a [facility] approved rewetting or conditioning solution may be used to insert  The container and disk must be discarded whenever you purchase new /rinse and disinfectant.    Not changing/discarding the disk will result in traces of disinfectant on the lenses even after a saline rinse.  You would then experience mild burning and redness    IF YOU WEAR….SCLERAL CONTACT LENSES     *VERY IMPORTANT*: ONLY NON-PRESERVED UNIT DOSE SALINE IS USED TO INSERT SCLERAL LENSES, NO OTHER PRESERVED SALINE OR MULTIPUPROSE SOLUTIONS ARE ALLOWED!    INSERTING SCLERAL LENSES     HARRIS METHOD  Wash hands  Place lens concave side up on a clean large harris  Fill lens to the top with unit dose non-preserved saline (Lacripure or ScleralFil preferred)*  Place head parallel with floor and hold lids open with one hand  Gently push lens up on to eye  Will feel cold as saline touches eye  Remove harris  from eye and lift head slowly  If bubble is in chamber repeat insertion with more saline filling lens     TRIPOD METHOD  Place lens concave side up resting on thumb, pointer finger and middle finger or on pointer and middle finger  Fill lens to top with unit dose non-preserved saline (Lacripure or ScleralFil preferred)*  Place head parallel with floor and hold lids open  Gently push lens up on to eye  Will feel cold as saline touches eye  If  bubble is in chamber repeat insertion with more unit dose non-preserved saline filling lens    REMOVING SCLERAL LENSES     SUCTION CUP METHOD  Use lower lid to loosen inferior lens edge at 6 o’clock, try to create an air bubble behind the lens    Place clean suction cup at 6 o’clock position of lens  Gently tilt lens off of eye     TWO HAND METHOD  Hold eye wide open   Look down and push upper lid under lens to break seal  Can also be accomplished by pushing in on the white part of the eye below the lens to break the seal  Keep upper lid firmly against top of lens  Use lower lid to push beneath and under the lower edge of the lens, the lens should pop out of the eye    CLEANING… SCLERAL LENSES   Use only [place], Unique pH**, or Clear Care cleaning solutions; Follow steps above for specific cleaning system    For more information or help on insertion and removal and troubleshooting of scleral lenses refer to [email or web address].org  * Your doctor may prescribe unit dose saline through your pharmacy   *Lacripure may be purchased at [email or web address], or from our office, or [facility]  * ScleralFil is made by Bausch & Lomb and may be purchased at [facility] or from our office   **Unique ph may be purchased at [email or web address]  or from our office or Amazon.      CONTACT LENS FEES/CREDIT POLICIES:    All contact lens material fees are due the day that lenses are ordered, with the exception of eligible Medicare patients or medically indicated lenses. Fitting fees are due by the final fitting visit.        Standard rigid lenses are warranted for 90 days from the original order date against breakage, parameter changes or cancellation at 100%.  Lost lenses cannot be credited.  Specialty rigid lenses will be credited at 75% against cancellation within the 90 day warranty period.      Fitting fees are professional service fees which cannot be credited once a fitting is initiated.          The cost of my lenses  per eye are $_________________.    The cost of my entire fitting fee is $_________________.    Patient signature: _____________________________ Date: ________________    Tech/Doctor’s Signature:  ________________________ Date: ________________

## 2024-07-02 NOTE — PROGRESS NOTES
Assessment/Plan   Diagnoses and all orders for this visit:  History of radial keratotomy  Irregular astigmatism of both eyes    Specialty Contact Lenses:  Specialty contact lenses were dispensed today for treatment of a medically indicated condition. The patient was educated on the proper care, handling, insertion and removal of the lenses.  The patient should follow the wearing time and return for follow-up as indicated, and call or come in to the office if the eye becomes red or painful after wearing the lenses.     Lacripure and tangible clean    Recheck 2 weeks may add hydrapeg - watch pinguecula, new CL do reach this area so doubt any notches will be needed      Remainider exam WNL

## 2024-07-16 ENCOUNTER — APPOINTMENT (OUTPATIENT)
Dept: OPHTHALMOLOGY | Facility: CLINIC | Age: 59
End: 2024-07-16
Payer: COMMERCIAL

## 2024-07-16 DIAGNOSIS — H52.213 IRREGULAR ASTIGMATISM OF BOTH EYES: ICD-10-CM

## 2024-07-16 DIAGNOSIS — Z98.890 HISTORY OF RADIAL KERATOTOMY: Primary | ICD-10-CM

## 2024-07-16 PROCEDURE — 1036F TOBACCO NON-USER: CPT | Performed by: OPTOMETRIST

## 2024-07-16 PROCEDURE — 99212 OFFICE O/P EST SF 10 MIN: CPT | Performed by: OPTOMETRIST

## 2024-07-16 PROCEDURE — TAXCU SALES TAX CUYAHOGA COUNTY: Performed by: OPTOMETRIST

## 2024-07-16 PROCEDURE — 99070 (U160 LACRIPURE (U16): Performed by: OPTOMETRIST

## 2024-07-16 ASSESSMENT — REFRACTION_CURRENTRX
OD_BRAND: SYNERGEYES VS
OS_BASECURVE: 8.4
OD_SPHERE: -3.50
OS_ADDL_SPECS: 3400 36-42
OD_BASECURVE: 8.4
OD_CYLINDER: SPHERE
OD_DIAMETER: 15.5
OS_AXIS: 155
OS_BASECURVE: 8.4
OD_CYLINDER: SPHERE
OS_DIAMETER: 15.5
OD_SPHERE: -3.50
OS_ADDL_SPECS: 3400 36-42
OS_BRAND: SYNERGEYES VS
OS_BRAND: SYNERGEYES VS
OD_DIAMETER: 14.5
OD_ADDL_SPECS: 3400 36-42
OS_CYLINDER: -1.00
OD_ADDL_SPECS: 3400 36-42
OS_CYLINDER: -1.00
OS_AXIS: 155
OS_SPHERE: +0.25
OS_DIAMETER: 15.5
OD_BRAND: SYNERGEYES VS
OS_SPHERE: +0.25
OD_BASECURVE: 8.4

## 2024-07-16 ASSESSMENT — VISUAL ACUITY
METHOD: SNELLEN - LINEAR
CORRECTION_TYPE: CONTACTS
OS_CC: 20/20
OD_CC: 20/20
OS_CC+: -2

## 2024-07-16 ASSESSMENT — ENCOUNTER SYMPTOMS
ENDOCRINE NEGATIVE: 0
HEMATOLOGIC/LYMPHATIC NEGATIVE: 0
CONSTITUTIONAL NEGATIVE: 0
CARDIOVASCULAR NEGATIVE: 0
GASTROINTESTINAL NEGATIVE: 0
PSYCHIATRIC NEGATIVE: 0
MUSCULOSKELETAL NEGATIVE: 0
NEUROLOGICAL NEGATIVE: 0
RESPIRATORY NEGATIVE: 0
EYES NEGATIVE: 0
ALLERGIC/IMMUNOLOGIC NEGATIVE: 0

## 2024-07-16 ASSESSMENT — EXTERNAL EXAM - LEFT EYE: OS_EXAM: NORMAL

## 2024-07-16 ASSESSMENT — EXTERNAL EXAM - RIGHT EYE: OD_EXAM: NORMAL

## 2024-07-16 ASSESSMENT — SLIT LAMP EXAM - LIDS
COMMENTS: CLEAN AND CLEAR
COMMENTS: CLEAN AND CLEAR

## 2024-07-16 NOTE — Clinical Note
Both eyes (OU) Contact Lens Order  Quantity: 1 Package: RGP Appointment needed? No Medically necessary? Yes Ship To: Home Additional instructions: Left eye is same lens as last but add hydrapeg, confirm diameter (is it 15.5?).  For Right eye need to go 1 mm smaller to 14.5 and add hydrapeg, both brian exchange - call consult to order

## 2024-07-16 NOTE — PROGRESS NOTES
Assessment/Plan   Diagnoses and all orders for this visit:  History of radial keratotomy  Irregular astigmatism of both eyes    Right eye pinguecula is still inflamed, go to smaller OAD Right eye (notch is not an option in Synergeyes VS), add hydrapeg Both eyes    Mail to patient    If no improvement in [place] Right eye then will do a scan fit pro      Follow up 6 weeks - will have CL by then

## 2024-08-27 ENCOUNTER — APPOINTMENT (OUTPATIENT)
Dept: PRIMARY CARE | Facility: CLINIC | Age: 59
End: 2024-08-27
Payer: COMMERCIAL

## 2024-09-03 ENCOUNTER — APPOINTMENT (OUTPATIENT)
Dept: OPHTHALMOLOGY | Facility: CLINIC | Age: 59
End: 2024-09-03
Payer: COMMERCIAL

## 2024-09-10 ENCOUNTER — APPOINTMENT (OUTPATIENT)
Dept: PRIMARY CARE | Facility: CLINIC | Age: 59
End: 2024-09-10
Payer: COMMERCIAL

## 2024-09-30 RX ORDER — PROPRANOLOL HYDROCHLORIDE 20 MG/1
20 TABLET ORAL DAILY
COMMUNITY
Start: 2024-04-24

## 2024-09-30 RX ORDER — EPINEPHRINE 0.22MG
100 AEROSOL WITH ADAPTER (ML) INHALATION
COMMUNITY
Start: 2024-04-25

## 2024-09-30 RX ORDER — AMOXICILLIN 500 MG/1
TABLET, FILM COATED ORAL
COMMUNITY
Start: 2024-07-30

## 2024-09-30 RX ORDER — BUPROPION HYDROCHLORIDE 150 MG/1
150 TABLET ORAL 2 TIMES DAILY
COMMUNITY
Start: 2024-09-01

## 2024-09-30 RX ORDER — DEXMETHYLPHENIDATE HYDROCHLORIDE 10 MG/1
1 CAPSULE, EXTENDED RELEASE ORAL
COMMUNITY
Start: 2024-03-18

## 2024-09-30 RX ORDER — WARFARIN 2.5 MG/1
2.5 TABLET ORAL NIGHTLY
COMMUNITY
Start: 2024-08-23

## 2024-10-01 ENCOUNTER — APPOINTMENT (OUTPATIENT)
Dept: GASTROENTEROLOGY | Facility: CLINIC | Age: 59
End: 2024-10-01
Payer: COMMERCIAL

## 2024-10-01 VITALS — BODY MASS INDEX: 25.2 KG/M2 | HEIGHT: 71 IN | HEART RATE: 43 BPM | WEIGHT: 180 LBS

## 2024-10-01 DIAGNOSIS — R13.12 OROPHARYNGEAL DYSPHAGIA: Primary | ICD-10-CM

## 2024-10-01 PROCEDURE — 3008F BODY MASS INDEX DOCD: CPT | Performed by: INTERNAL MEDICINE

## 2024-10-01 PROCEDURE — 1036F TOBACCO NON-USER: CPT | Performed by: INTERNAL MEDICINE

## 2024-10-01 PROCEDURE — 99214 OFFICE O/P EST MOD 30 MIN: CPT | Performed by: INTERNAL MEDICINE

## 2024-10-01 NOTE — PROGRESS NOTES
Subjective     History of Present Illness:   Sukhjinder Cedeno is a 59 y.o. male who presents to GI clinic for intermittent dysphagia.  Most commonly seems to happen when he is eating nuts, almonds, peanuts for example.  Also has happened with food on a couple occasions most recently he recalls chicken.  Rare heartburn.  No other GI symptoms.  (He notes that his mother used to have the exact same phenomenon).    Barium swallow 2020 showed a 10% Schatzki's ring at the GE junction and minor cervical spine osteophyte impingement on the esophagus.  (Was having same symptoms at that time).  Review of Systems  Review of Systems    Social History   reports that he has quit smoking. His smoking use included cigarettes. He has never used smokeless tobacco.     Allergies  Allergies   Allergen Reactions    Cat Dander Other    Kiwi Swelling     Mouth swelling       Medications  Current Outpatient Medications   Medication Instructions    amoxicillin (Amoxil) 500 mg tablet TAKE 4 TABLETS, BY MOUTH, ONE HOUR PRIOR TO DENTAL PROCEDURE    buPROPion XL (WELLBUTRIN XL) 300 mg, oral, Daily    buPROPion XL (WELLBUTRIN XL) 150 mg, oral, 2 times daily    coenzyme Q-10 100 mg, oral, Daily RT    dexmethylphenidate XR (Focalin XR) 10 mg 24 hr capsule 1 capsule, oral, Daily (0630)    lamoTRIgine (LaMICtal) 150 mg tablet oral, 2 times daily, orally 2 times a day    NON FORMULARY Medical compression stockings 20-30mmHg knee high compression stockings. Please measure patient for appropriate fit    propranolol (INDERAL) 20 mg, oral, Daily    rosuvastatin (CRESTOR) 5 mg, oral, Daily    sildenafil (Viagra) 100 mg tablet oral, take 1 tablet by mouth once daily if needed 30 to 60 MINUTES prior to intercourse    warfarin (Coumadin) 10 mg tablet 1 tablet, oral, Daily    warfarin (COUMADIN) 2.5 mg, oral, Nightly        Objective   Visit Vitals  Pulse (!) 43      Physical Exam  Constitutional:       General: He is not in acute distress.     Appearance:  Normal appearance. He is normal weight.   HENT:      Head: Normocephalic and atraumatic.      Nose: Nose normal.      Mouth/Throat:      Mouth: Mucous membranes are moist.      Pharynx: Oropharynx is clear. No oropharyngeal exudate or posterior oropharyngeal erythema.   Eyes:      Extraocular Movements: Extraocular movements intact.      Conjunctiva/sclera: Conjunctivae normal.      Pupils: Pupils are equal, round, and reactive to light.   Musculoskeletal:      Cervical back: Normal range of motion and neck supple.   Lymphadenopathy:      Cervical: No cervical adenopathy.   Neurological:      Mental Status: He is alert.                       Assessment/Plan   Sukhjinder Cedeno is a 59 y.o. male who presents to GI clinic for intermittent dysphagia to solids, especially nuts.  Unlikely to have a mechanical obstruction with his minor abnormalities seen on barium swallow 4 years ago when having the same symptoms.  Could relate to eosinophilic esophagitis with specific foods as triggers, possible dryness phenomenon (thinks that it is poultry of solid foods other than nuts) but only notes minor dryness of the eyes with contact lenses and has not noticed mouth dryness to suggest Sjogren's.  Less likely primary motility disorder.    Plan    Modified barium swallow    If diagnosis unclear if that likely followed with EGD (he is on warfarin for factor V Leiden)      Vlad Tracey MD

## 2024-10-03 ENCOUNTER — APPOINTMENT (OUTPATIENT)
Dept: PRIMARY CARE | Facility: CLINIC | Age: 59
End: 2024-10-03
Payer: COMMERCIAL

## 2024-10-09 ENCOUNTER — APPOINTMENT (OUTPATIENT)
Dept: PRIMARY CARE | Facility: CLINIC | Age: 59
End: 2024-10-09
Payer: COMMERCIAL

## 2024-10-09 VITALS
DIASTOLIC BLOOD PRESSURE: 78 MMHG | HEIGHT: 71 IN | WEIGHT: 180 LBS | SYSTOLIC BLOOD PRESSURE: 145 MMHG | BODY MASS INDEX: 25.2 KG/M2 | HEART RATE: 50 BPM | OXYGEN SATURATION: 96 %

## 2024-10-09 DIAGNOSIS — Z11.59 NEED FOR HEPATITIS C SCREENING TEST: ICD-10-CM

## 2024-10-09 DIAGNOSIS — N40.1 BENIGN PROSTATIC HYPERPLASIA WITH URINARY FREQUENCY: ICD-10-CM

## 2024-10-09 DIAGNOSIS — N52.9 ERECTILE DYSFUNCTION, UNSPECIFIED ERECTILE DYSFUNCTION TYPE: ICD-10-CM

## 2024-10-09 DIAGNOSIS — Z23 NEED FOR VACCINE FOR TD (TETANUS-DIPHTHERIA): ICD-10-CM

## 2024-10-09 DIAGNOSIS — Z11.4 SCREENING FOR HIV (HUMAN IMMUNODEFICIENCY VIRUS): ICD-10-CM

## 2024-10-09 DIAGNOSIS — Z00.00 ROUTINE GENERAL MEDICAL EXAMINATION AT A HEALTH CARE FACILITY: Primary | ICD-10-CM

## 2024-10-09 DIAGNOSIS — Z51.81 ENCOUNTER FOR MEDICATION MONITORING: ICD-10-CM

## 2024-10-09 DIAGNOSIS — Z12.5 ENCOUNTER FOR PROSTATE CANCER SCREENING: ICD-10-CM

## 2024-10-09 DIAGNOSIS — R35.0 BENIGN PROSTATIC HYPERPLASIA WITH URINARY FREQUENCY: ICD-10-CM

## 2024-10-09 DIAGNOSIS — R73.9 HYPERGLYCEMIA: ICD-10-CM

## 2024-10-09 DIAGNOSIS — E55.9 VITAMIN D DEFICIENCY: ICD-10-CM

## 2024-10-09 DIAGNOSIS — D68.51 HOMOZYGOUS FACTOR V LEIDEN MUTATION (MULTI): ICD-10-CM

## 2024-10-09 DIAGNOSIS — K55.069 MESENTERIC VEIN THROMBOSIS (MULTI): ICD-10-CM

## 2024-10-09 DIAGNOSIS — H93.12 LEFT-SIDED TINNITUS: ICD-10-CM

## 2024-10-09 DIAGNOSIS — F31.75 BIPOLAR 1 DISORDER, DEPRESSED, PARTIAL REMISSION (MULTI): ICD-10-CM

## 2024-10-09 DIAGNOSIS — E78.5 DYSLIPIDEMIA: ICD-10-CM

## 2024-10-09 DIAGNOSIS — Z23 ENCOUNTER FOR IMMUNIZATION: ICD-10-CM

## 2024-10-09 PROCEDURE — 90714 TD VACC NO PRESV 7 YRS+ IM: CPT | Performed by: STUDENT IN AN ORGANIZED HEALTH CARE EDUCATION/TRAINING PROGRAM

## 2024-10-09 PROCEDURE — 99214 OFFICE O/P EST MOD 30 MIN: CPT | Performed by: STUDENT IN AN ORGANIZED HEALTH CARE EDUCATION/TRAINING PROGRAM

## 2024-10-09 PROCEDURE — 90471 IMMUNIZATION ADMIN: CPT | Performed by: STUDENT IN AN ORGANIZED HEALTH CARE EDUCATION/TRAINING PROGRAM

## 2024-10-09 PROCEDURE — 99396 PREV VISIT EST AGE 40-64: CPT | Performed by: STUDENT IN AN ORGANIZED HEALTH CARE EDUCATION/TRAINING PROGRAM

## 2024-10-09 PROCEDURE — 90656 IIV3 VACC NO PRSV 0.5 ML IM: CPT | Performed by: STUDENT IN AN ORGANIZED HEALTH CARE EDUCATION/TRAINING PROGRAM

## 2024-10-09 PROCEDURE — 1036F TOBACCO NON-USER: CPT | Performed by: STUDENT IN AN ORGANIZED HEALTH CARE EDUCATION/TRAINING PROGRAM

## 2024-10-09 PROCEDURE — 90472 IMMUNIZATION ADMIN EACH ADD: CPT | Performed by: STUDENT IN AN ORGANIZED HEALTH CARE EDUCATION/TRAINING PROGRAM

## 2024-10-09 PROCEDURE — 3008F BODY MASS INDEX DOCD: CPT | Performed by: STUDENT IN AN ORGANIZED HEALTH CARE EDUCATION/TRAINING PROGRAM

## 2024-10-09 RX ORDER — ACETAMINOPHEN 500 MG
2000 TABLET ORAL DAILY
COMMUNITY
End: 2024-10-09 | Stop reason: SDUPTHER

## 2024-10-09 RX ORDER — BUPROPION HYDROCHLORIDE 150 MG/1
150 TABLET ORAL EVERY MORNING
Start: 2024-10-09

## 2024-10-09 RX ORDER — LAMOTRIGINE 150 MG/1
150 TABLET ORAL 2 TIMES DAILY
Start: 2024-10-09

## 2024-10-09 RX ORDER — TAMSULOSIN HYDROCHLORIDE 0.4 MG/1
0.4 CAPSULE ORAL DAILY
Qty: 90 CAPSULE | Refills: 0 | Status: SHIPPED | OUTPATIENT
Start: 2024-10-09 | End: 2024-10-09 | Stop reason: WASHOUT

## 2024-10-09 RX ORDER — SILDENAFIL 100 MG/1
100 TABLET, FILM COATED ORAL AS NEEDED
Start: 2024-10-09

## 2024-10-09 RX ORDER — ACETAMINOPHEN 500 MG
2000 TABLET ORAL DAILY
Start: 2024-10-09

## 2024-10-09 RX ORDER — TAMSULOSIN HYDROCHLORIDE 0.4 MG/1
0.8 CAPSULE ORAL DAILY
Qty: 180 CAPSULE | Refills: 1 | Status: SHIPPED | OUTPATIENT
Start: 2024-10-09

## 2024-10-09 RX ORDER — GLUCOSAMINE/CHONDRO SU A 500-400 MG
1 TABLET ORAL DAILY
COMMUNITY

## 2024-10-09 ASSESSMENT — PATIENT HEALTH QUESTIONNAIRE - PHQ9
1. LITTLE INTEREST OR PLEASURE IN DOING THINGS: NOT AT ALL
2. FEELING DOWN, DEPRESSED OR HOPELESS: NOT AT ALL
SUM OF ALL RESPONSES TO PHQ9 QUESTIONS 1 AND 2: 0
SUM OF ALL RESPONSES TO PHQ9 QUESTIONS 1 AND 2: 0
2. FEELING DOWN, DEPRESSED OR HOPELESS: NOT AT ALL
1. LITTLE INTEREST OR PLEASURE IN DOING THINGS: NOT AT ALL

## 2024-10-09 ASSESSMENT — ENCOUNTER SYMPTOMS
CHILLS: 0
LIGHT-HEADEDNESS: 0
LOSS OF SENSATION IN FEET: 0
OCCASIONAL FEELINGS OF UNSTEADINESS: 0
DIZZINESS: 0
DIFFICULTY URINATING: 1
DYSURIA: 0
DEPRESSION: 0
EYE PAIN: 0
FREQUENCY: 1
HEMATURIA: 0
UNEXPECTED WEIGHT CHANGE: 0
FEVER: 0
SHORTNESS OF BREATH: 0
FLANK PAIN: 0
ABDOMINAL PAIN: 0
RHINORRHEA: 0

## 2024-10-09 NOTE — ASSESSMENT & PLAN NOTE
-This occurred in the setting of factor V Leyden.  Patient was previously going to [facility] for care.  He was told that he needs to be on lifetime anticoagulation therapy.

## 2024-10-09 NOTE — ASSESSMENT & PLAN NOTE
-I will try to switch the patient from Coumadin to Eliquis.  Cost might be an issue.  If patient is not able to afford Eliquis, we will have to continue Coumadin therapy.  -Medication side effects and switching from Coumadin to Eliquis instructions were reviewed with the patient.

## 2024-10-09 NOTE — PROGRESS NOTES
"Subjective       Patient ID: Sukhjinder Cedeno is a 59 y.o. male who presents today for a Preventative Visit/Physical, nocturia,  Factor 5 leiden mutation, and Bipolar Disorder.    Urinary/?BPH HPI:  He gets up 4-times in the middle of the night for the past 2-3 years. No urinary hesitancy, but a weekend stream, no dribbling.     Current Diet: regular diet    Current Physical Activity: walking    Tobacco/Alcohol/Drug Use:   Social History     Tobacco Use    Smoking status: Former     Types: Cigarettes    Smokeless tobacco: Never   Substance Use Topics    Alcohol use: Not on file       Required Screenings/Immunizations:   Health Maintenance Due   Topic Date Due    HIV Screening  Never done    Hepatitis C Screening  Never done    Diabetes Screening  Never done    MMR Vaccines (1 of 1 - Standard series) Never done    RSV Pregnant patients and/or  patients aged 60+ years (1 - 1-dose 60+ series) Never done       Problems to be addressed today in addition to Preventative Services: As stated in orders.     Review of Systems   Constitutional:  Negative for chills, fever and unexpected weight change.   HENT:  Negative for rhinorrhea.    Eyes:  Negative for pain.   Respiratory:  Negative for shortness of breath.    Cardiovascular:  Negative for chest pain.   Gastrointestinal:  Negative for abdominal pain.   Genitourinary:  Positive for difficulty urinating and frequency (Nocturia). Negative for dysuria, flank pain, hematuria and penile pain.   Skin:  Negative for rash.   Neurological:  Negative for dizziness, syncope and light-headedness.   Psychiatric/Behavioral:  Negative for behavioral problems and suicidal ideas.        /78   Pulse 50   Ht 1.803 m (5' 11\")   Wt 81.6 kg (180 lb)   SpO2 96%   BMI 25.10 kg/m²      Objective   Physical Exam  Vitals reviewed.   Constitutional:       General: He is not in acute distress.  HENT:      Head: Normocephalic.      Right Ear: External ear normal.      Left Ear: External ear " "normal.      Nose: No rhinorrhea.      Mouth/Throat:      Mouth: Mucous membranes are moist.   Eyes:      Conjunctiva/sclera: Conjunctivae normal.   Cardiovascular:      Rate and Rhythm: Normal rate and regular rhythm.      Heart sounds: No murmur heard.     No friction rub. No gallop.   Pulmonary:      Effort: No respiratory distress.      Breath sounds: No wheezing, rhonchi or rales.   Abdominal:      General: Bowel sounds are normal. There is no distension.      Palpations: Abdomen is soft.      Tenderness: There is no abdominal tenderness.   Musculoskeletal:      Cervical back: Neck supple.      Right lower leg: No edema.      Left lower leg: No edema.   Skin:     General: Skin is warm and dry.      Capillary Refill: Capillary refill takes less than 2 seconds.   Neurological:      Mental Status: He is alert.      Gait: Gait normal.           Labs:   Lab Results   Component Value Date    WBC 6.8 06/28/2023    HGB 12.3 (L) 06/28/2023    HCT 38.5 (L) 06/28/2023     06/28/2023    TSH 1.79 03/07/2023    INR 1.7 (H) 06/28/2023     Lab Results   Component Value Date     06/28/2023    K 4.1 06/28/2023     06/28/2023    BUN 17 06/28/2023    CREATININE 1.4 06/28/2023    GLUCOSE 98 06/28/2023    CALCIUM 8.7 06/28/2023    PROT 8.1 (H) 06/27/2023    BILITOT 0.5 06/27/2023    ALKPHOS 40 06/27/2023    AST 21 06/27/2023    ALT 20 06/27/2023    GLOB 3.3 06/27/2023     Lab Results   Component Value Date    CHOL 214 (H) 03/07/2023      Lab Results   Component Value Date    TRIG 149 03/07/2023      Lab Results   Component Value Date    HDL 43.9 03/07/2023     No results found for: \"LDLCALC\"   Lab Results   Component Value Date    VLDL 30 03/07/2023    No components found for: \"CHOLHDLRATI0\"    Imaging/Testing: Corneal Topography - OU - Both Eyes  Right Eye  Findings include irregular astigmatism, previous refractive surgery. SimK   Steep was 37.7 diopters. SimK Flat was 34.9 diopters. Central cornea   thickness " was 552 microns. K Max was 51.7 diopters.     Left Eye  Findings include irregular astigmatism, previous refractive surgery. SimK   Steep was 34.4 diopters. SimK Flat was 31.9 diopters. Central cornea   thickness was 487 microns. K Max was 60.4 diopters.     Notes  OD: central thinning    OS: patch of extreme thinning superior nasal    Assessment/Plan   Problem List Items Addressed This Visit       Homozygous Factor V Leiden mutation (Multi)     -I will try to switch the patient from Coumadin to Eliquis.  Cost might be an issue.  If patient is not able to afford Eliquis, we will have to continue Coumadin therapy.  -Medication side effects and switching from Coumadin to Eliquis instructions were reviewed with the patient.         Relevant Medications    apixaban (Eliquis) 5 mg tablet    Mesenteric vein thrombosis (Multi)     -This occurred in the setting of factor V Leyden.  Patient was previously going to [facility] for care.  He was told that he needs to be on lifetime anticoagulation therapy.         Bipolar 1 disorder, depressed, partial remission (Multi)     I reviewed prior records in the chart.  Patient has been treated with Lamictal for many years.  He was diagnosed because his mood was fluctuating quite a bit with depression episodes.  He states he has been stable with Wellbutrin 150 mg daily.  -Patient states that his mood fluctuates too much when he tapered himself off of the Lamictal.  He denies hospitalization for psychiatric reasons, or manic episodes.         Relevant Medications    buPROPion XL (Wellbutrin XL) 150 mg 24 hr tablet    lamoTRIgine (LaMICtal) 150 mg tablet    Vitamin D deficiency    Relevant Medications    cholecalciferol (Vitamin D3) 50 mcg (2,000 unit) capsule    Other Relevant Orders    Vitamin D 25-Hydroxy,Total (for eval of Vitamin D levels)     Other Visit Diagnoses       Routine general medical examination at a health care facility    -  Primary    Relevant Orders    Thyroid  Stimulating Hormone    Urinalysis with Reflex Culture and Microscopic    Left-sided tinnitus        Relevant Orders    Referral to ENT    Benign prostatic hyperplasia with urinary frequency        Relevant Medications    tamsulosin (Flomax) 0.4 mg 24 hr capsule    Erectile dysfunction, unspecified erectile dysfunction type        Relevant Medications    sildenafil (Viagra) 100 mg tablet    Encounter for immunization        Relevant Orders    Flu vaccine, trivalent, preservative free, age 6 months and greater (Fluarix/Fluzone/Flulaval) (Completed)    Encounter for prostate cancer screening        Relevant Orders    Prostate Specific Antigen, Screen    Encounter for medication monitoring        Relevant Orders    CBC and Auto Differential    Comprehensive Metabolic Panel    Dyslipidemia        Relevant Orders    Lipid panel    Hyperglycemia        Relevant Orders    Hemoglobin A1c    Screening for HIV (human immunodeficiency virus)        Relevant Orders    HIV 1/2 Antigen/Antibody Screen with Reflex to Confirmation    Need for hepatitis C screening test        Relevant Orders    Hepatitis C antibody    Need for vaccine for Td (tetanus-diphtheria)        Relevant Orders    Td adult (TDVAX) tetanus-diphtheria toxoids (2-2 Lf units), adsorbed (Completed)              As part of today's Preventative Visit, an age and gender-appropriate history and physical was performed, as documented below. Counseling and anticipatory guidance were performed, and risk factor reduction interventions (including [place] Preventative Services Task Force recommended screening tests) were utilized/ordered as outlined in the above Assessment and Plan. All patient medications were reviewed, and refilled if necessary.    Patient and I discussed diet/nutrition, lifestyle modifications, safety, medication indications and side effects, and health goals.    current treatment plan is effective, no change in therapy, orders and follow up as  documented in EMR, lab results reviewed with patient, repeat labs ordered prior to next appointment, reviewed compliance with lifestyle measures, reviewed diet, exercise and weight control, reviewed medications and side effects in detail     Return to clinic in 3 months to review urinary symptoms after starting Flomax today.  Determine how patient is doing after switching from Coumadin to Eliquis.      I have reviewed OARRS report, consistent with prescribed medication. I have considered risks of abuse, diversion, side effects and feel clinically benefit of medication outweighs risks at this time.         Yes

## 2024-10-09 NOTE — ASSESSMENT & PLAN NOTE
I reviewed prior records in the chart.  Patient has been treated with Lamictal for many years.  He was diagnosed because his mood was fluctuating quite a bit with depression episodes.  He states he has been stable with Wellbutrin 150 mg daily.  -Patient states that his mood fluctuates too much when he tapered himself off of the Lamictal.  He denies hospitalization for psychiatric reasons, or manic episodes.   see MDM

## 2024-10-10 ENCOUNTER — APPOINTMENT (OUTPATIENT)
Dept: PRIMARY CARE | Facility: CLINIC | Age: 59
End: 2024-10-10
Payer: COMMERCIAL

## 2024-10-14 DIAGNOSIS — D68.51 HOMOZYGOUS FACTOR V LEIDEN MUTATION (MULTI): Primary | ICD-10-CM

## 2024-10-14 RX ORDER — WARFARIN 10 MG/1
10 TABLET ORAL NIGHTLY
Qty: 90 TABLET | Refills: 1 | Status: SHIPPED | OUTPATIENT
Start: 2024-10-14

## 2024-10-14 NOTE — PROGRESS NOTES
I sent in Coumadin 10 mg daily to pill pack at patient's request.  Please note that we tried switching him to Eliquis, however, he cannot for the cost of over $500 per month.    Apolinar Ramirez MD, [facility]  Physician  Department of Family Medicine of [facility] - Primary Care

## 2024-10-15 ENCOUNTER — TELEPHONE (OUTPATIENT)
Dept: CARDIOLOGY | Facility: CLINIC | Age: 59
End: 2024-10-15
Payer: COMMERCIAL

## 2024-10-15 ENCOUNTER — TELEPHONE (OUTPATIENT)
Dept: PRIMARY CARE | Facility: CLINIC | Age: 59
End: 2024-10-15

## 2024-10-15 ENCOUNTER — APPOINTMENT (OUTPATIENT)
Dept: RADIOLOGY | Facility: HOSPITAL | Age: 59
End: 2024-10-15
Payer: COMMERCIAL

## 2024-10-15 DIAGNOSIS — D68.51 HOMOZYGOUS FACTOR V LEIDEN MUTATION (MULTI): Primary | ICD-10-CM

## 2024-10-15 NOTE — TELEPHONE ENCOUNTER
Pt called and asked to be scheduled in [facility].  There is a referral in place form Dr. Apolinar Ramirez to Pharmacy Clinic.  Called MD's office and instructed the MA how to enter proper referral to [facility], and urged her to call with any questions of if help is needed to enter Referral.

## 2024-10-15 NOTE — TELEPHONE ENCOUNTER
Rec'd Anticoagulation Referral, (found in [facility]) and called pt.  He has been on warfarin for many years, and was monitored at [facility].  Appt made in [place] for 10/22.

## 2024-10-15 NOTE — PROGRESS NOTES
ADULT AUDIOLOGY EVALUATION    Name:  Sukhjinder Cedeno  :  1965  Age:  59 y.o.  Date of Evaluation:  10/16/2024     IMPRESSIONS     Today's test results indicate normal middle ear functioning in the right ear, with hyper-compliant middle ear function in the left ear. Audiometry shows normal hearing sensitivity falling to moderate high frequency sensorineural hearing loss in the right ear, and moderately-severe high frequency sensorineural hearing loss in the left ear. Asymmetry is present from 4571-6963 Hz, with greater hearing loss in the left ear than the right. Word recognition is excellent in both ears.      RECOMMENDATIONS     Continue medical follow up with ENT. Patient is scheduled to see KRISTI Painter directly following today's testing.  No hearing aid recommended at this time due to primarily normal hearing sensitivity at aidable frequencies.  Use of low level background noise when tinnitus is bothersome.   Return for annual hearing evaluation or sooner should concerns arise.    Time: 4568-6121    HISTORY     Sukhjinder Cedeno is seen today for an audiologic evaluation in conjunction with otolaryngology. He reports intermittent tinnitus in the left ear that is primarily  heard at night while trying to sleep. This has been present for around 4 months. Tinnitus is described as a high pitch that is non-pulsatile and fluctuates in intensity. He is mostly able to ignore this, but has had instances where it is bothersome at night. Patient reports that he briefly thought he heard tinnitus in the right ear today, but this only lasted for several seconds. Sukhjinder noted occasional difficulty hearing in loud restaurants, but denied other hearing difficulties. Patient denied dizziness, aural fullness, recent ear infections, otalgia, otorrhea, history of otologic surgeries or history of loud noise exposure.       TEST RESULTS     Otoscopic Evaluation:  Right Ear: Clear ear canal with unremarkable tympanic  membrane  Left Ear: Clear ear canal with unremarkable tympanic membrane    Tympanometry:   Right Ear: Normal, type A tympanogram with normal ear canal volume, peak pressure and compliance.   Left Ear: Deep, Ad tympanogram with normal ear canal volume, peak pressure and excessive compliance.    Ipsilateral Acoustic Reflexes:   Right Ear: Present 9470-5114 Hz, absent 500 and 4000 Hz.   Left Ear: Present 500-4000 Hz.     Pure Tone Audiometry (125-8000 Hz):     Right Ear: Borderline mild sensorineural hearing loss at 125 Hz, rising to normal hearing sensitivity from 250-4000 Hz, and falling back to mild at 6000 Hz, and moderate at 8000 Hz.    Left Ear: Borderline mild sensorineural hearing loss at 125 Hz, rising to normal hearing sensitivity from 250-3000 Hz, falling to mild at 4000 Hz, moderate at 6000 Hz, and moderately-severe at 8000 Hz. Asymmetry observed from 0706-9552 Hz.     Speech Audiometry:   Right Ear:    Speech Reception Threshold (SRT) was obtained at 10 dBHL using monitored live voice (MLV).   Word Recognition scores were excellent (100%) in quiet when words were presented at 60 dBHL using recorded NU-6 word list ordered by difficulty.  Left Ear:    Speech Reception Threshold (SRT) was obtained at 10 dBHL using monitored live voice (MLV).   Word Recognition scores were excellent (100%) in quiet when words were presented at 65 dBHL using recorded NU-6 word list ordered by difficulty.         Marty Olvera, CCC-A  Clinical Audiologist    Degree of Hearing Sensitivity Decibel Range   Within Normal Limits (WNL) 0-25   Mild 26-40   Moderate 41-55   Moderately-Severe 56-70   Severe 71-90   Profound 91+      Key   CNT/DNT Could Not Test/Did Not Test   TM Tympanic Membrane   WNL Within Normal Limits   HA Hearing Aid   SNHL Sensorineural Hearing Loss   CHL Conductive Hearing Loss   NIHL Noise-Induced Hearing Loss   ECV Ear Canal Volume   MLV Monitored Live Voice     AUDIOGRAM

## 2024-10-15 NOTE — TELEPHONE ENCOUNTER
Spoke with cami about referral that was put in for patient that was wrong stated it shouldv'e been a order for [facility] esstential do not change route . Not pharmacy clinic . Cami said give her a call if you have any  question and she can walk you thru it . [phone] ([facility])

## 2024-10-15 NOTE — TELEPHONE ENCOUNTER
I called Malinda's number, but she did not answer.  Anyway I put in a new order for [facility] at [facility].  Titi dominguez was not listed.      10/15/24 at 10:32 AM - Apolinar Ramirez MD

## 2024-10-16 ENCOUNTER — CLINICAL SUPPORT (OUTPATIENT)
Dept: AUDIOLOGY | Facility: CLINIC | Age: 59
End: 2024-10-16
Payer: COMMERCIAL

## 2024-10-16 ENCOUNTER — APPOINTMENT (OUTPATIENT)
Dept: OTOLARYNGOLOGY | Facility: CLINIC | Age: 59
End: 2024-10-16
Payer: COMMERCIAL

## 2024-10-16 VITALS — BODY MASS INDEX: 24.38 KG/M2 | WEIGHT: 180 LBS | HEIGHT: 72 IN | TEMPERATURE: 97.3 F

## 2024-10-16 DIAGNOSIS — H93.12 LEFT-SIDED TINNITUS: ICD-10-CM

## 2024-10-16 DIAGNOSIS — H90.3 SENSORINEURAL HEARING LOSS (SNHL) OF BOTH EARS: Primary | ICD-10-CM

## 2024-10-16 DIAGNOSIS — H90.3 ASYMMETRICAL SENSORINEURAL HEARING LOSS: ICD-10-CM

## 2024-10-16 DIAGNOSIS — H91.8X3 ASYMMETRICAL HEARING LOSS: ICD-10-CM

## 2024-10-16 PROCEDURE — 92557 COMPREHENSIVE HEARING TEST: CPT

## 2024-10-16 PROCEDURE — 92550 TYMPANOMETRY & REFLEX THRESH: CPT

## 2024-10-16 PROCEDURE — 1036F TOBACCO NON-USER: CPT | Performed by: NURSE PRACTITIONER

## 2024-10-16 PROCEDURE — 99203 OFFICE O/P NEW LOW 30 MIN: CPT | Performed by: NURSE PRACTITIONER

## 2024-10-16 PROCEDURE — 3008F BODY MASS INDEX DOCD: CPT | Performed by: NURSE PRACTITIONER

## 2024-10-16 ASSESSMENT — PATIENT HEALTH QUESTIONNAIRE - PHQ9
SUM OF ALL RESPONSES TO PHQ9 QUESTIONS 1 AND 2: 0
2. FEELING DOWN, DEPRESSED OR HOPELESS: NOT AT ALL
1. LITTLE INTEREST OR PLEASURE IN DOING THINGS: NOT AT ALL

## 2024-10-16 NOTE — PROGRESS NOTES
Subjective   Patient ID: Sukhjinder Cedeno is a 59 y.o. male who presents for Tinnitus (Left side ).  HPI  This patient is referred for evaluation of  non-pulsatile left-sided tinnitus.  The patient is not accompanied by a family member.   When asked about ear pain, hearing loss, discharge from ear, tinnitus, aural fullness or autophony, the patient admits to left-sided tinnitus which was intermittent over the past 6 months, but seems to have resolved.  Patient primarily notices this when lying on his right side watching television.  When asked whether the tinnitus interfered with the patient's daily activities or prevented the patient from falling asleep, the patient reported it did not  When asked about a significant past otological history including history of prior ear surgery, noise exposure, exposure to ototoxic drugs or agents, and/or family history of hearing loss, the patient admits to none.    Review of Systems  A comprehensive or 10 points review of the patient's constitutional, neurological, HEENT, pulmonary, cardiovascular and genito-urinary systems showed only those mentioned in history of present illness.    Objective   Physical Exam  Constitutional: no fever, chills, weight loss or weight gain   General appearance: Appears well, well-nourished, well groomed. No acute distress.   Communication: Normal communication   Psychiatric: Oriented to person, place and time. Normal mood and affect.   Neurologic: Cranial nerves II-XII grossly intact and symmetric bilaterally.   Head and Face:   Head: Atraumatic with no masses, lesions or scarring.   Face: Normal symmetry, no paralysis, synkinesis or facial tic. No scars or deformities.   TMJ: Normal, no trismus.   Eyes: Conjunctiva not edematous or erythematous   Ears: External inspection of ears with no deformity, scars or masses. Bilateral EACs clear and bilateral TMs intact with no signs of effusions   Nose: External inspection of nose: No nasal lesions,  lacerations or scars.   Neck: Normal appearing, symmetric, trachea midline.   Cardiovascular: Examination of peripheral vascular system shows no clubbing or cyanosis.   Respiratory: No respiratory distress increased work of breathing. Inspection of the chest with symmetric chest expansion and normal respiratory effort.   Skin: No rashes in the head or neck  Bedside occulomotor function assessment for ocular pursuits and saccades, spontaneous nystagmus,  positional and postural testing (Romberg, Fukuta) is normal.    Assessment/Plan        This patient presents for initial evaluation of chronic acquired bilateral/asymmetrical sensorineural hearing loss and left subjective tinnitus.    Reassurance given that otologic exam and balance testing today are both normal.  Audiogram was reviewed in detail.  He has a left greater than right sensorineural asymmetry at 4000 through 8000 Hz which is likely the cause of his left-sided tinnitus.  We discussed possible etiologies of asymmetry including viral inflammation, vascular insufficiency, noise exposure, acoustic neuroma.  We also discussed that TMJ dysfunction and cervicogenic issues can also contribute to tinnitus.  I offered MRI IAC with and without contrast to further evaluate for an acoustic neuroma which patient declines at this time.  I recommended repeating his audiogram in 1 year.  If he has any significant increase in left-sided tinnitus, worsening of left-sided hearing loss, dizziness, or left facial numbness/twitching, I asked that he contact my office and I would encourage imaging.  All questions were answered to patient's satisfaction.    This note was created using speech recognition transcription software. Despite proofreading, several typographical errors might be present that might affect the meaning of the content. Please call with any questions.      MATIAS Brewster-CNP 10/16/24 5:12 PM

## 2024-10-18 NOTE — TELEPHONE ENCOUNTER
I called the [facility] pharmacy and clarified their question.    Apolinar Ramirez MD, [facility]  Physician  Department of Family Medicine of [facility] - Primary Care

## 2024-10-18 NOTE — TELEPHONE ENCOUNTER
Spoke with pharmacist from [facility] pharmacy called about medication warfarin 2.5mg wouldn't to know if patient still taking that dose . Please call pharmacy back to verify ref# [id number]   phone # [phone]

## 2024-10-21 ENCOUNTER — HOSPITAL ENCOUNTER (OUTPATIENT)
Dept: RADIOLOGY | Facility: HOSPITAL | Age: 59
Discharge: HOME | End: 2024-10-21
Payer: COMMERCIAL

## 2024-10-21 DIAGNOSIS — R13.12 OROPHARYNGEAL DYSPHAGIA: ICD-10-CM

## 2024-10-21 PROCEDURE — 2500000005 HC RX 250 GENERAL PHARMACY W/O HCPCS: Performed by: INTERNAL MEDICINE

## 2024-10-21 PROCEDURE — 74230 X-RAY XM SWLNG FUNCJ C+: CPT | Performed by: RADIOLOGY

## 2024-10-21 PROCEDURE — 74230 X-RAY XM SWLNG FUNCJ C+: CPT

## 2024-10-21 PROCEDURE — 92611 MOTION FLUOROSCOPY/SWALLOW: CPT | Mod: GN

## 2024-10-21 NOTE — PROGRESS NOTES
Speech-Language Pathology    Speech-Language Pathology    Outpatient Modified Barium Swallow Study    Patient Name: Sukhjinder Cedeno  MRN: [id number]  : 1965  Today's Date: 10/21/24             Modified Barium Swallow Study completed. Informed verbal consent obtained prior to completion of exam. Trials of thin, nectar/mildly thick liquid, honey/moderately thick liquid, puree, regular solids and barium tablet with thin liquid were administered.       SLP: Neli Charles SLP   Contact info: [employer] secure chat; phone: [phone]      Reason for Referral: post prandial cough with specific foods (e.g. nuts and meat)  Patient Hx: Schatzki's ring, Bipolar disorder, DLD, SNHL  Respiratory Status:  Room air  Current diet: no restrictions    Pain:  Pain Scale: none reported    DIET RECOMMENDATIONS:   - Regular (IDDSI Level 7)  - Thin liquids (IDDSI Level 0)    Per the results of today's MBSS, patient to continue regular diet with no further SLP services required at this time.    SLP PLAN:  Skilled SLP Services: No further skilled SLP intervention is warranted for dysphagia at this time.    Education Provided: Results and recommendations of MBSS were reviewed with  patient following exam. Discussed swallowing precautions of slow rate and small bolus size discussed with use of liquid wash as needed. Verbal understanding and agreement confirmed of all information provided.     Treatment Provided Today: N/A    Additional Medical Consults Suggested:   N/A    Repeat Study: N/A    SLP Impressions with Severity Rating:   Pt presents with an essentially functional swallowing mechanism for oral and pharyngeal phases.  Swallowing physiology is characterized by premature pharyngeal entry intermittently with boluses tested. Patient c/o distaste for barium, which may have impacted oral transit of boluses. No laryngeal penetration viewed during exam with no aspiration visualized. Adequate pharyngeal clearance achieved following  initial swallow. Oral residue with solids noted, which fully cleared following spontaneous reswallow. Full esophageal clearance noted with boluses tested, including barium tablet.    *Of note: The A-P bolus follow-through is not intended to be utilized as a diagnostic assessment of the esophagus, but rather as a tool to observe the biomechanical aspects of the swallow continuum to inform the need for further evaluation by medical specialists, as applicable.     Strategies attempted- Reswallow (spontaneous)    OUTCOME MEASURES:  Functional Oral Intake Scale  Functional Oral Intake Scale: Level 7        total oral diet with no restrictions       Eating Assessment Tool (EAT-10)   EAT 10 not utilized due to patient inability to complete.      Rosenbek's Penetration Aspiration Scale  Thin Liquids: 1. NO ASPIRATION & NO PENETRATION - no aspiration, contrast does not enter airway  [place] Thick Liquids: 1. NO ASPIRATION & NO PENETRATION - no aspiration, contrast does not enter airway  Honey Thick Liquids: 1. NO ASPIRATION & NO PENETRATION - no aspiration, contrast does not enter airway  Puree: 1. NO ASPIRATION & NO PENETRATION - no aspiration, contrast does not enter airway  Solids: 1. NO ASPIRATION & NO PENETRATION - no aspiration, contrast does not enter airway    *Anatomical marker not used 2/2 inability to maintain placement due to dermal irregularity.

## 2024-10-21 NOTE — TELEPHONE ENCOUNTER
I addended my original anticoagulationWarfarin monitoring referral/order to indicate that patient can continue his current scheduling regimen of INR follow-ups every 6 weeks.    Apolinar Ramirez MD, [facility]  Physician  Department of Family Medicine of [facility] - Primary Care

## 2024-10-22 ENCOUNTER — ANTICOAGULATION - WARFARIN VISIT (OUTPATIENT)
Dept: CARDIOLOGY | Facility: CLINIC | Age: 59
End: 2024-10-22
Payer: COMMERCIAL

## 2024-10-22 DIAGNOSIS — D68.51 HOMOZYGOUS FACTOR V LEIDEN MUTATION (MULTI): Primary | ICD-10-CM

## 2024-10-22 LAB
POC INR: 2.1
POC PROTHROMBIN TIME: NORMAL

## 2024-10-22 PROCEDURE — 85610 PROTHROMBIN TIME: CPT | Mod: QW

## 2024-10-22 PROCEDURE — 99211 OFF/OP EST MAY X REQ PHY/QHP: CPT

## 2024-10-22 RX ORDER — WARFARIN 2.5 MG/1
TABLET ORAL
Qty: 30 TABLET | Refills: 11 | Status: SHIPPED | OUTPATIENT
Start: 2024-10-22 | End: 2024-10-23 | Stop reason: SDUPTHER

## 2024-10-22 NOTE — TELEPHONE ENCOUNTER
I put in the new prescription for the Coumadin 2.5 mg tablet on the EMR system.    Apolinar Ramirez MD, [facility]  Physician  Department of Family Medicine of [facility] - Primary Care

## 2024-10-22 NOTE — TELEPHONE ENCOUNTER
[facility] called and stated that they need clarification on PT RX: Warafin dosage as they see a different dosage that the patient was taking in September please advise. I advised that Dr. Ramirez had already spoken with an [facility] pharmacy rep and clarified the dosing

## 2024-10-22 NOTE — PROGRESS NOTES
Patient identification verified with 2 identifiers.    Location: [facility] at [facility] - [address] [address] [phone] option #1     Referring Physician: DR. NAPOLEON ACOSTA  Enrollment/ Re-enrollment date: 10/15/2025   INR Goal: 2.0-3.0  INR monitoring is per [facility]MS protocol.  Anticoagulation Medication: warfarin  Indication:  FACTOR V LEIDEN    Subjective   Bleeding signs/symptoms: No    Bruising: No   Major bleeding event: No  Thrombosis signs/symptoms: No  Thromboembolic event: No  Missed doses: No  Extra doses: No  Medication changes: No  Dietary changes: No  Change in health: No  Change in activity: No  Alcohol: No  Other concerns: No    Upcoming Procedures:  Does the Patient Have any upcoming procedures that require interruption in anticoagulation therapy? no  Does the patient require bridging? no      Anticoagulation Summary  As of 10/22/2024      INR goal:  2.0-3.0   TTR:  --   INR used for dosin.10 (10/22/2024)   Weekly warfarin total:  65 mg               Assessment/Plan   Therapeutic     1. New dose: no change  PT WAS SEEN TODAY FOR NPV. PER PT HE HAS BEEN ON WARFARIN SINCE  AND WAS PREVIOUSLY BEING MANAGED AT [facility] BUT SWITCHED TO  BECAUSE ALL HIS OTHER PROVIDERS WERE HERE. PT REPORTS LAST INR 10/9/24 WAS 2.6 (VERIFIED IN EPIC). PT VERBALIZED THAT HE DID NOT WANT A REVIEW OF WARFARIN EDUCATION AND DECLINED TO TAKE HIS AVS. PT PROVIDED HIS CURRENT DOSING INSTRUCTIONS WHICH WERE MAINTAINED AT THIS VISIT.   2. Next INR:  6 WEEKS PER DR. SPRINGER REFERRAL ORDER      Education provided to patient during the visit:  Patient instructed to call in interim with questions, concerns and changes.   Patient educated on compliance with dosing, follow up appointments, and prescribed plan of care.

## 2024-10-23 RX ORDER — WARFARIN 2.5 MG/1
TABLET ORAL
Qty: 30 TABLET | Refills: 11 | Status: SHIPPED | OUTPATIENT
Start: 2024-10-23 | End: 2024-11-04 | Stop reason: SDUPTHER

## 2024-10-23 NOTE — TELEPHONE ENCOUNTER
Spoke with Sandy from [facility] and she stated they needed the Rx sent to them. Pended and sent to Dr. Marino.

## 2024-11-03 ENCOUNTER — E-VISIT (OUTPATIENT)
Dept: PRIMARY CARE | Facility: CLINIC | Age: 59
End: 2024-11-03
Payer: COMMERCIAL

## 2024-11-03 DIAGNOSIS — D68.51 HOMOZYGOUS FACTOR V LEIDEN MUTATION (MULTI): ICD-10-CM

## 2024-11-04 RX ORDER — WARFARIN 2.5 MG/1
TABLET ORAL
Qty: 30 TABLET | Refills: 0 | Status: SHIPPED | OUTPATIENT
Start: 2024-11-04 | End: 2024-11-08 | Stop reason: SDUPTHER

## 2024-11-04 RX ORDER — WARFARIN 10 MG/1
TABLET ORAL
Qty: 90 TABLET | Refills: 0 | Status: SHIPPED | OUTPATIENT
Start: 2024-11-04 | End: 2024-11-08 | Stop reason: SDUPTHER

## 2024-11-08 RX ORDER — WARFARIN 10 MG/1
TABLET ORAL
Qty: 90 TABLET | Refills: 11 | Status: SHIPPED | OUTPATIENT
Start: 2024-11-08

## 2024-11-08 RX ORDER — WARFARIN 2.5 MG/1
TABLET ORAL
Qty: 30 TABLET | Refills: 11 | Status: SHIPPED | OUTPATIENT
Start: 2024-11-08

## 2024-12-04 ENCOUNTER — ANTICOAGULATION - WARFARIN VISIT (OUTPATIENT)
Dept: CARDIOLOGY | Facility: CLINIC | Age: 59
End: 2024-12-04
Payer: COMMERCIAL

## 2024-12-04 DIAGNOSIS — D68.51 HOMOZYGOUS FACTOR V LEIDEN MUTATION (MULTI): Primary | ICD-10-CM

## 2024-12-04 LAB
POC INR: 3.4
POC PROTHROMBIN TIME: NORMAL

## 2024-12-04 PROCEDURE — 85610 PROTHROMBIN TIME: CPT | Mod: QW

## 2024-12-04 PROCEDURE — 99211 OFF/OP EST MAY X REQ PHY/QHP: CPT

## 2024-12-04 NOTE — PROGRESS NOTES
Patient identification verified with 2 identifiers.    Location: [facility] at [facility] - [address] [address] [phone] option #1     Referring Physician: DR. NAPOLEON ACOSTA  Enrollment/ Re-enrollment date: 10/15/2025   INR Goal: 2.0-3.0  INR monitoring is per AMS protocol.  Anticoagulation Medication: warfarin  Indication:  FACTOR V LEIDEN    Subjective   Bleeding signs/symptoms: No    Bruising: No   Major bleeding event: No  Thrombosis signs/symptoms: No  Thromboembolic event: No  Missed doses: No  Extra doses: No  Medication changes: No  Dietary changes: No  Change in health: No  Change in activity: No  Alcohol: No  Other concerns: No    Upcoming Procedures:  Does the Patient Have any upcoming procedures that require interruption in anticoagulation therapy? no  Does the patient require bridging? no      Anticoagulation Summary  As of 12/4/2024      INR goal:  2.0-3.0   TTR:  69.2% (1.4 mo)   INR used for dosing:  3.40 (12/4/2024)   Weekly warfarin total:  65 mg               Assessment/Plan   Supra therapeutic in setting of cranberry consumption  Will maintain TWD and RTC in 2 weeks per pt availability.    ** Next INR:  6 WEEKS PER DR. SPRINGER REFERRAL ORDER      Education provided to patient during the visit:  Patient instructed to call in interim with questions, concerns and changes.   Patient educated on compliance with dosing, follow up appointments, and prescribed plan of care.

## 2024-12-17 ENCOUNTER — ANTICOAGULATION - WARFARIN VISIT (OUTPATIENT)
Dept: CARDIOLOGY | Facility: CLINIC | Age: 59
End: 2024-12-17
Payer: COMMERCIAL

## 2024-12-17 DIAGNOSIS — D68.51 HOMOZYGOUS FACTOR V LEIDEN MUTATION (MULTI): Primary | ICD-10-CM

## 2024-12-17 LAB
POC INR: 2.5
POC PROTHROMBIN TIME: NORMAL

## 2024-12-17 PROCEDURE — 85610 PROTHROMBIN TIME: CPT | Mod: QW

## 2024-12-17 PROCEDURE — 99211 OFF/OP EST MAY X REQ PHY/QHP: CPT

## 2024-12-17 NOTE — PROGRESS NOTES
Patient identification verified with 2 identifiers.    Location: [facility] at [facility] - [address] [address] [phone] option #1     Referring Physician: DR. SPRINGER  Enrollment/ Re-enrollment date: 10/15/25   INR Goal: 2.0-3.0  INR monitoring is per [facility]MS protocol.  Anticoagulation Medication: warfarin  Indication:  FACTOR V LEIDEN    Subjective   Bleeding signs/symptoms: No    Bruising: No   Major bleeding event: No  Thrombosis signs/symptoms: No  Thromboembolic event: No  Missed doses: No  Extra doses: No  Medication changes: No  Dietary changes: No  Change in health: No  Change in activity: No  Alcohol: No  Other concerns: No    Upcoming Procedures:  Does the Patient Have any upcoming procedures that require interruption in anticoagulation therapy? no  Does the patient require bridging? no      Anticoagulation Summary  As of 2024      INR goal:  2.0-3.0   TTR:  66.1% (1.9 mo)   INR used for dosin.50 (2024)   Weekly warfarin total:  65 mg               Assessment/Plan   Therapeutic     1. New dose: no change  PT THINKS HE MAY HAVE DOUBLE DOSE BEFORE LAST WEEK'S SUPRATHERAPEUTIC INR.   2. Next INR:  5 WEEKS.  ORDER STATES THAT PT MAY RTC IN 6 WEEKS.  HE WILL BE OUT OF TOWN, SO WILL RTC IN 5 WEEKS.        Education provided to patient during the visit:  Patient instructed to call in interim with questions, concerns and changes.   Patient educated on dietary consistency in vitamin k consumption.   Patient educated on signs of bleeding/clotting.

## 2025-01-21 ENCOUNTER — ANTICOAGULATION - WARFARIN VISIT (OUTPATIENT)
Dept: CARDIOLOGY | Facility: CLINIC | Age: 60
End: 2025-01-21
Payer: COMMERCIAL

## 2025-01-21 DIAGNOSIS — D68.51 HOMOZYGOUS FACTOR V LEIDEN MUTATION (MULTI): Primary | ICD-10-CM

## 2025-01-21 LAB
POC INR: 2
POC PROTHROMBIN TIME: NORMAL

## 2025-01-21 PROCEDURE — 99211 OFF/OP EST MAY X REQ PHY/QHP: CPT

## 2025-01-21 PROCEDURE — 85610 PROTHROMBIN TIME: CPT | Mod: QW

## 2025-01-21 NOTE — PROGRESS NOTES
Patient identification verified with 2 identifiers.    Location: [facility] at [facility] - [address] [address] [phone] option #1     Referring Physician: DR. ESPOSITO  Enrollment/ Re-enrollment date: 10/15/25   INR Goal: 2.0-3.0  INR monitoring is per [facility] protocol.  Anticoagulation Medication: warfarin  Indication:  FACTOR V LEIDEN    Subjective   Bleeding signs/symptoms: No    Bruising: No   Major bleeding event: No  Thrombosis signs/symptoms: No  Thromboembolic event: No  Missed doses: No  Extra doses: No  Medication changes: No  Dietary changes: Yes  PT HAS BEEN EATING MORE SALADS LATELY. HE WILL EAT A LITTLE LESS.  Change in health: No  Change in activity: No  Alcohol: No  Other concerns: No    Upcoming Procedures:  Does the Patient Have any upcoming procedures that require interruption in anticoagulation therapy? no  Does the patient require bridging? no      Anticoagulation Summary  As of 2025      INR goal:  2.0-3.0   TTR:  79.1% (3 mo)   INR used for dosin.00 (2025)   Weekly warfarin total:  65 mg               Assessment/Plan   Therapeutic     1. New dose: no change    2. Next INR:  6 WEEKS PER ORDER      Education provided to patient during the visit:  Patient instructed to call in interim with questions, concerns and changes.   Patient educated on dietary consistency in vitamin k consumption.   Patient educated on signs of bleeding/clotting.

## 2025-02-10 DIAGNOSIS — U07.1 COVID-19: Primary | ICD-10-CM

## 2025-02-28 PROBLEM — S46.812A STRAIN OF LEFT LEVATOR SCAPULAE MUSCLE: Status: RESOLVED | Noted: 2023-03-01 | Resolved: 2025-02-28

## 2025-02-28 PROBLEM — M25.529 ELBOW PAIN: Status: RESOLVED | Noted: 2023-03-01 | Resolved: 2025-02-28

## 2025-02-28 PROBLEM — F39 MOOD DISORDER (CMS-HCC): Status: RESOLVED | Noted: 2023-03-01 | Resolved: 2025-02-28

## 2025-02-28 PROBLEM — M54.32 SCIATICA OF LEFT SIDE: Status: RESOLVED | Noted: 2023-03-01 | Resolved: 2025-02-28

## 2025-02-28 PROBLEM — M79.18 RHOMBOID PAIN: Status: RESOLVED | Noted: 2023-03-01 | Resolved: 2025-02-28

## 2025-02-28 PROBLEM — M25.469 EFFUSION OF KNEE: Status: RESOLVED | Noted: 2023-03-01 | Resolved: 2025-02-28

## 2025-02-28 PROBLEM — M17.0 PRIMARY OSTEOARTHRITIS OF BOTH KNEES: Status: RESOLVED | Noted: 2023-03-01 | Resolved: 2025-02-28

## 2025-02-28 PROBLEM — M22.42 CHONDROMALACIA OF LEFT PATELLA: Status: RESOLVED | Noted: 2023-03-01 | Resolved: 2025-02-28

## 2025-02-28 PROBLEM — S76.312A STRAIN OF LEFT PIRIFORMIS MUSCLE: Status: RESOLVED | Noted: 2023-03-01 | Resolved: 2025-02-28

## 2025-02-28 PROBLEM — M79.672 LEFT FOOT PAIN: Status: RESOLVED | Noted: 2023-03-01 | Resolved: 2025-02-28

## 2025-02-28 PROBLEM — M19.071 LOCALIZED OSTEOARTHRITIS OF ANKLE, RIGHT: Status: RESOLVED | Noted: 2023-03-01 | Resolved: 2025-02-28

## 2025-02-28 PROBLEM — M77.01 MEDIAL EPICONDYLITIS OF RIGHT ELBOW: Status: RESOLVED | Noted: 2023-03-01 | Resolved: 2025-02-28

## 2025-02-28 PROBLEM — S92.355A CLOSED NONDISPLACED FRACTURE OF FIFTH LEFT METATARSAL BONE: Status: RESOLVED | Noted: 2023-03-01 | Resolved: 2025-02-28

## 2025-02-28 PROBLEM — R13.10 DYSPHAGIA: Status: RESOLVED | Noted: 2023-03-01 | Resolved: 2025-02-28

## 2025-02-28 PROBLEM — M25.561 KNEE PAIN, RIGHT: Status: RESOLVED | Noted: 2023-03-01 | Resolved: 2025-02-28

## 2025-02-28 PROBLEM — L08.9 FINGER INFECTION: Status: RESOLVED | Noted: 2023-03-01 | Resolved: 2025-02-28

## 2025-02-28 PROBLEM — M25.511 RIGHT SHOULDER PAIN: Status: RESOLVED | Noted: 2023-03-01 | Resolved: 2025-02-28

## 2025-02-28 PROBLEM — M25.562 LEFT KNEE PAIN: Status: RESOLVED | Noted: 2023-03-01 | Resolved: 2025-02-28

## 2025-02-28 PROBLEM — R09.A2 GLOBUS SENSATION: Status: RESOLVED | Noted: 2023-03-01 | Resolved: 2025-02-28

## 2025-02-28 PROBLEM — R35.0 BENIGN PROSTATIC HYPERPLASIA WITH URINARY FREQUENCY: Status: ACTIVE | Noted: 2025-02-28

## 2025-02-28 PROBLEM — M54.12 CERVICAL RADICULOPATHY: Status: RESOLVED | Noted: 2023-03-01 | Resolved: 2025-02-28

## 2025-02-28 PROBLEM — R07.0 THROAT DISCOMFORT: Status: RESOLVED | Noted: 2023-03-01 | Resolved: 2025-02-28

## 2025-02-28 PROBLEM — M25.552 LEFT HIP PAIN: Status: RESOLVED | Noted: 2023-03-01 | Resolved: 2025-02-28

## 2025-02-28 PROBLEM — M54.50 CHRONIC LOW BACK PAIN: Status: RESOLVED | Noted: 2023-03-01 | Resolved: 2025-02-28

## 2025-02-28 PROBLEM — G56.21 CUBITAL TUNNEL SYNDROME ON RIGHT: Status: RESOLVED | Noted: 2023-03-01 | Resolved: 2025-02-28

## 2025-02-28 PROBLEM — G89.29 CHRONIC LOW BACK PAIN: Status: RESOLVED | Noted: 2023-03-01 | Resolved: 2025-02-28

## 2025-02-28 PROBLEM — S83.281A TEAR OF LATERAL MENISCUS OF RIGHT KNEE: Status: RESOLVED | Noted: 2023-03-01 | Resolved: 2025-02-28

## 2025-02-28 PROBLEM — N40.1 BENIGN PROSTATIC HYPERPLASIA WITH URINARY FREQUENCY: Status: ACTIVE | Noted: 2025-02-28

## 2025-02-28 PROBLEM — G56.01 CARPAL TUNNEL SYNDROME OF RIGHT WRIST: Status: RESOLVED | Noted: 2023-03-01 | Resolved: 2025-02-28

## 2025-02-28 PROBLEM — I83.10 VARICOSE VEINS WITH INFLAMMATION: Status: RESOLVED | Noted: 2023-03-01 | Resolved: 2025-02-28

## 2025-02-28 PROBLEM — S76.302A LEFT HAMSTRING INJURY: Status: RESOLVED | Noted: 2023-03-01 | Resolved: 2025-02-28

## 2025-02-28 PROBLEM — M54.50 LOW BACK PAIN: Status: RESOLVED | Noted: 2023-03-01 | Resolved: 2025-02-28

## 2025-02-28 PROBLEM — M23.90 KNEE INTERNAL DERANGEMENT: Status: RESOLVED | Noted: 2023-03-01 | Resolved: 2025-02-28

## 2025-02-28 PROBLEM — M19.019 ARTHRITIS OF SHOULDER REGION: Status: RESOLVED | Noted: 2023-03-01 | Resolved: 2025-02-28

## 2025-02-28 PROBLEM — M25.569 JOINT PAIN, KNEE: Status: RESOLVED | Noted: 2023-03-01 | Resolved: 2025-02-28

## 2025-02-28 PROBLEM — M25.572 LEFT ANKLE PAIN: Status: RESOLVED | Noted: 2023-03-01 | Resolved: 2025-02-28

## 2025-02-28 PROBLEM — M25.621 STIFFNESS OF RIGHT ELBOW, NOT ELSEWHERE CLASSIFIED: Status: RESOLVED | Noted: 2023-03-01 | Resolved: 2025-02-28

## 2025-02-28 PROBLEM — E55.9 VITAMIN D DEFICIENCY: Status: RESOLVED | Noted: 2024-10-09 | Resolved: 2025-02-28

## 2025-02-28 PROBLEM — M22.41 CHONDROMALACIA OF RIGHT PATELLA: Status: RESOLVED | Noted: 2023-03-01 | Resolved: 2025-02-28

## 2025-02-28 PROBLEM — M89.8X5 PAIN OF LEFT FEMUR: Status: RESOLVED | Noted: 2023-03-01 | Resolved: 2025-02-28

## 2025-02-28 PROBLEM — M77.00 EPICONDYLITIS ELBOW, MEDIAL: Status: RESOLVED | Noted: 2023-03-01 | Resolved: 2025-02-28

## 2025-02-28 PROBLEM — M25.519 SHOULDER PAIN: Status: RESOLVED | Noted: 2023-03-01 | Resolved: 2025-02-28

## 2025-02-28 ASSESSMENT — ENCOUNTER SYMPTOMS
DIFFICULTY URINATING: 1
UNEXPECTED WEIGHT CHANGE: 0
ABDOMINAL PAIN: 0
DYSURIA: 0
SHORTNESS OF BREATH: 0
CHILLS: 0
LIGHT-HEADEDNESS: 0
DIZZINESS: 0
HEMATURIA: 0
FEVER: 0
RHINORRHEA: 0
FREQUENCY: 1
EYE PAIN: 0
FLANK PAIN: 0

## 2025-03-01 NOTE — ASSESSMENT & PLAN NOTE
-Continue Coumadin as monitored by  Coumadin clinic.  -Unfortunately, he was not able to afford Eliquis.

## 2025-03-01 NOTE — PROGRESS NOTES
Subjective       Patient ID: Sukhjinder Cedeno is a 59 y.o. male who presents today for nocturia,  Factor 5 leiden mutation, and Bipolar Disorder.    Initial PCP history 10/9/2024: This is the first time I am meeting the patient.  Urinary/?BPH HPI:  He gets up 4-times in the middle of the night for the past 2-3 years. No urinary hesitancy, but a weekend stream, no dribbling.     Current Diet: regular diet    Current Physical Activity: walking    Tobacco/Alcohol/Drug Use:   Social History     Tobacco Use    Smoking status: Former     Types: Cigarettes    Smokeless tobacco: Never   Substance Use Topics    Alcohol use: Not on file       Required Screenings/Immunizations:   Health Maintenance Due   Topic Date Due    HIV Screening  Never done    Hepatitis C Screening  Never done    Diabetes Screening  Never done    MMR Vaccines (1 of 1 - Standard series) Never done    Pneumococcal Vaccine (1 of 1 - PCV) 06/01/2013    RSV High Risk: (Elderly (60+) or Pregnant Population) (1 - Risk 60-74 years 1-dose series) Never done    COVID-19 Vaccine (8 - 2024-25 season) 09/01/2024       Problems to be addressed today in addition to Preventative Services: As stated in orders.     Review of Systems   Constitutional:  Negative for chills, fever and unexpected weight change.   HENT:  Negative for rhinorrhea.    Eyes:  Negative for pain.   Respiratory:  Negative for shortness of breath.    Cardiovascular:  Negative for chest pain.   Gastrointestinal:  Negative for abdominal pain.   Genitourinary:  Positive for difficulty urinating and frequency (Nocturia). Negative for dysuria, flank pain, hematuria and penile pain.   Skin:  Negative for rash.   Neurological:  Negative for dizziness, syncope and light-headedness.   Psychiatric/Behavioral:  Negative for behavioral problems and suicidal ideas.        There were no vitals taken for this visit.     Objective   Physical Exam  Vitals reviewed.   Constitutional:       General: He is not in acute  "distress.  HENT:      Head: Normocephalic.      Right Ear: External ear normal.      Left Ear: External ear normal.      Nose: No rhinorrhea.      Mouth/Throat:      Mouth: Mucous membranes are moist.   Eyes:      Conjunctiva/sclera: Conjunctivae normal.   Cardiovascular:      Rate and Rhythm: Normal rate and regular rhythm.      Heart sounds: No murmur heard.     No friction rub. No gallop.   Pulmonary:      Effort: No respiratory distress.      Breath sounds: No wheezing, rhonchi or rales.   Abdominal:      General: Bowel sounds are normal. There is no distension.      Palpations: Abdomen is soft.      Tenderness: There is no abdominal tenderness.   Musculoskeletal:      Cervical back: Neck supple.      Right lower leg: No edema.      Left lower leg: No edema.   Skin:     General: Skin is warm and dry.      Capillary Refill: Capillary refill takes less than 2 seconds.   Neurological:      Mental Status: He is alert.      Gait: Gait normal.           Labs:   Lab Results   Component Value Date    WBC 6.8 06/28/2023    HGB 12.3 (L) 06/28/2023    HCT 38.5 (L) 06/28/2023     06/28/2023    TSH 1.79 03/07/2023    INR 2.00 01/21/2025     Lab Results   Component Value Date     06/28/2023    K 4.1 06/28/2023     06/28/2023    BUN 17 06/28/2023    CREATININE 1.4 06/28/2023    GLUCOSE 98 06/28/2023    CALCIUM 8.7 06/28/2023    PROT 8.1 (H) 06/27/2023    BILITOT 0.5 06/27/2023    ALKPHOS 40 06/27/2023    AST 21 06/27/2023    ALT 20 06/27/2023    GLOB 3.3 06/27/2023     Lab Results   Component Value Date    CHOL 214 (H) 03/07/2023      Lab Results   Component Value Date    TRIG 149 03/07/2023      Lab Results   Component Value Date    HDL 43.9 03/07/2023     No results found for: \"LDLCALC\"   Lab Results   Component Value Date    VLDL 30 03/07/2023    No components found for: \"CHOLHDLRATI0\"    Imaging/Testing: FL modified barium swallow study  Narrative: Interpreted By:  Nikole Khan,  and Araceli Quinteros "   STUDY:  FL MODIFIED BARIUM SWALLOW STUDY;; 10/21/2024 11:54 am      INDICATION:  Signs/Symptoms:Intermittent dysphagia to solids, especially but not  exclusively with nuts.      ,R13.12 Dysphagia, oropharyngeal phase      COMPARISON:  None.      ACCESSION NUMBER(S):  [id number]      ORDERING CLINICIAN:  SUSAN RONDON      TECHNIQUE:  Modified Barium Swallow Study completed. Informed verbal consent  obtained prior to completion of exam. Trials of thin, nectar/mildly  thick liquid, honey/moderately thick liquid, puree, regular solids  and barium tablet with thin liquid were administered.          SLP: APOLINAR Hurd  Contact info: mention chat; phone: [phone]      SPEECH FINDINGS:  Reason for Referral: post prandial cough with specific foods (e.g.  nuts and meat) Patient Hx: Schatzki's ring, Bipolar disorder, DLD,  SNHL Respiratory Status:  Room air  Current diet: no restrictions      Pain:  Pain Scale: none reported      DIET RECOMMENDATIONS:  - Regular (IDDSI Level 7)  - Thin liquids (IDDSI Level 0)      Per the results of today's MBSS, patient to continue regular diet  with no further SLP services required at this time.      SLP PLAN:  Skilled SLP Services: No further skilled SLP intervention is  warranted for dysphagia at this time.      Education Provided: Results and recommendations of MBSS were reviewed  with  patient following exam. Discussed swallowing precautions of  slow rate and small bolus size discussed with use of liquid wash as  needed. Verbal understanding and agreement confirmed of all  information provided.      Treatment Provided Today: N/A      Additional Medical Consults Suggested:  N/A      Repeat Study: N/A      SLP Impressions with Severity Rating:  Pt presents with an essentially functional swallowing mechanism for  oral and pharyngeal phases.  Swallowing physiology is characterized  by premature pharyngeal entry intermittently with boluses tested.  Patient c/o  distaste for barium, which may have impacted oral transit  of boluses. No laryngeal penetration viewed during exam with no  aspiration visualized. Adequate pharyngeal clearance achieved  following initial swallow. Oral residue with solids noted, which  fully cleared following spontaneous reswallow. Full esophageal  clearance noted with boluses tested, including barium tablet.      *Of note: The A-P bolus follow-through is not intended to be utilized  as a diagnostic assessment of the esophagus, but rather as a tool to  observe the biomechanical aspects of the swallow continuum to inform  the need for further evaluation by medical specialists, as applicable.      Strategies attempted- Reswallow (spontaneous)      OUTCOME MEASURES:  Functional Oral Intake Scale  Functional Oral Intake Scale: Level 7        total oral diet with no  restrictions          Eating Assessment Tool (EAT-10)  EAT 10 not utilized due to patient inability to complete.      Rosenbek's Penetration Aspiration Scale  Thin Liquids: 1. NO ASPIRATION & NO PENETRATION - no aspiration,  contrast does not enter airway [place] Thick Liquids: 1. NO ASPIRATION  & NO PENETRATION - no aspiration, contrast does not enter airway  Honey Thick Liquids: 1. NO ASPIRATION & NO PENETRATION - no  aspiration, contrast does not enter airway Puree: 1. NO ASPIRATION  & NO PENETRATION - no aspiration, contrast does not enter airway  Solids: 1. NO ASPIRATION & NO PENETRATION - no aspiration, contrast  does not enter airway      *Anatomical marker not used 2/2 inability to maintain placement due  to dermal irregularity.              Speech Therapy section of this report signed by ANGELO Manning/SLP on 10/21/2024 at 1:20 pm.      RADIOLOGY FINDINGS:  Degenerative changes are seen in the upper cervical spine that was  visualized.      Radiology section of this report signed by Nikole Khan MD.      Impression: No aspiration and no penetration with thin liquids,  thick liquids,  purees and solids.      MACRO:  None      Signed by: Nikole Khan 10/21/2024 1:35 PM  Dictation workstation:   [id number]    Assessment/Plan   Problem List Items Addressed This Visit          Medium    Homozygous Factor V Leiden mutation (Multi) - Primary     -Continue Coumadin as monitored by  Coumadin clinic.  -Unfortunately, he was not able to afford Eliquis.         Mesenteric vein thrombosis (Multi)     -This occurred in the setting of factor V Le[place].  Patient was previously going to [facility] for care.  He was told that he needs to be on lifetime anticoagulation therapy.  -Continue Coumadin as monitored by  Coumadin clinic.  -Unfortunately, he was not able to afford Eliquis.         Bipolar 1 disorder, depressed, partial remission (Multi)     I reviewed prior records in the chart.  Patient has been treated with Lamictal for many years.  He was diagnosed because his mood was fluctuating quite a bit with depression episodes.  He states he has been stable with Wellbutrin 150 mg daily.  -Patient states that his mood fluctuates too much when he tapered himself off of the Lamictal.  He denies hospitalization for psychiatric reasons, or manic episodes.         Benign prostatic hyperplasia with urinary frequency     Other Visit Diagnoses       Encounter for medication monitoring        Routine general medical examination at a health care facility        Encounter for prostate cancer screening                    As part of today's Preventative Visit, an age and gender-appropriate history and physical was performed, as documented below. Counseling and anticipatory guidance were performed, and risk factor reduction interventions (including [place] Preventative Services Task Force recommended screening tests) were utilized/ordered as outlined in the above Assessment and Plan. All patient medications were reviewed, and refilled if necessary.    Patient and I discussed diet/nutrition,  lifestyle modifications, safety, medication indications and side effects, and health goals.    current treatment plan is effective, no change in therapy, orders and follow up as documented in EMR, lab results reviewed with patient, repeat labs ordered prior to next appointment, reviewed compliance with lifestyle measures, reviewed diet, exercise and weight control, reviewed medications and side effects in detail     Return to clinic in 3 months to review urinary symptoms after starting Flomax today.  Determine how patient is doing after switching from Coumadin to Eliquis.      I have reviewed OARRS report, consistent with prescribed medication. I have considered risks of abuse, diversion, side effects and feel clinically benefit of medication outweighs risks at this time.         after starting Flomax today.  Determine how patient is doing after switching from Coumadin to Eliquis.      I have reviewed OARRS report, consistent with prescribed medication. I have considered risks of abuse, diversion, side effects and feel clinically benefit of medication outweighs risks at this time.

## 2025-03-01 NOTE — ASSESSMENT & PLAN NOTE
I reviewed prior records in the chart.  Patient has been treated with Lamictal for many years.  He was diagnosed because his mood was fluctuating quite a bit with depression episodes.  He states he has been stable with Wellbutrin 150 mg daily.  -Patient states that his mood fluctuates too much when he tapered himself off of the Lamictal.  He denies hospitalization for psychiatric reasons, or manic episodes.

## 2025-03-01 NOTE — ASSESSMENT & PLAN NOTE
-Flomax 0.4mg nightly was prescribed 10/9/2024 due to BPH with nocturia.  Unfortunately, he has had dizziness and lightheadedness, even with Flomax 0.2 mg.  -I explained to the patient t that finasteride is reserved for significantly enlarged prostates (prostate volume >30 mL, prostate-specific antigen >1.5 ng/mL, or palpable prostate enlargement on digital rectal exam) or those with hematuria associated with benign prostatic hyperplasia.  -

## 2025-03-01 NOTE — ASSESSMENT & PLAN NOTE
-- DO NOT REPLY / DO NOT REPLY ALL --  -- Message is from [facility]agement [facility] Operations ([facility]) --    Offered Waitlist if Available for the Visit Type? No    Caller is requesting an appointment - at a sooner time than what was available.      Caller wants appointment with Specialist - we do not schedule for them    Reason for Visit: cardiac clearance needed as soon as possible - open availability needs to be done prior to 8/15 anytime for preop on 8/18    Is the patient currently scheduled? No    Preferred time to be seen: as soon as possible     Caller Information       Type Contact Phone/Fax    08/01/2023 05:03 PM CDT Phone (Incoming) Samira Lovell (Self) [phone] (M)          Alternative phone number: none    Can a detailed message be left? Yes    Message Turnaround: [place]:    Refer to site's KB page for routing instructions    Please give this turnaround time to the caller:   \"You can expect to receive a response 1-3 business days after your provider's clinical team reviews the message\"   -This occurred in the setting of factor V Le[place].  Patient was previously going to [facility] for care.  He was told that he needs to be on lifetime anticoagulation therapy.  -Continue Coumadin as monitored by  Coumadin clinic.  -Unfortunately, he was not able to afford Eliquis.

## 2025-03-03 ENCOUNTER — APPOINTMENT (OUTPATIENT)
Dept: PRIMARY CARE | Facility: CLINIC | Age: 60
End: 2025-03-03
Payer: COMMERCIAL

## 2025-03-03 VITALS
OXYGEN SATURATION: 97 % | HEART RATE: 60 BPM | DIASTOLIC BLOOD PRESSURE: 91 MMHG | BODY MASS INDEX: 21.4 KG/M2 | HEIGHT: 72 IN | SYSTOLIC BLOOD PRESSURE: 145 MMHG | WEIGHT: 158 LBS

## 2025-03-03 DIAGNOSIS — K55.069 MESENTERIC VEIN THROMBOSIS (MULTI): ICD-10-CM

## 2025-03-03 DIAGNOSIS — D68.51 HOMOZYGOUS FACTOR V LEIDEN MUTATION (MULTI): Primary | ICD-10-CM

## 2025-03-03 DIAGNOSIS — Z00.00 ROUTINE GENERAL MEDICAL EXAMINATION AT A HEALTH CARE FACILITY: ICD-10-CM

## 2025-03-03 DIAGNOSIS — N40.1 BENIGN PROSTATIC HYPERPLASIA WITH URINARY FREQUENCY: ICD-10-CM

## 2025-03-03 DIAGNOSIS — R35.0 BENIGN PROSTATIC HYPERPLASIA WITH URINARY FREQUENCY: ICD-10-CM

## 2025-03-03 DIAGNOSIS — N40.1 BENIGN PROSTATIC HYPERPLASIA WITH URINARY FREQUENCY: Primary | ICD-10-CM

## 2025-03-03 DIAGNOSIS — R35.0 BENIGN PROSTATIC HYPERPLASIA WITH URINARY FREQUENCY: Primary | ICD-10-CM

## 2025-03-03 DIAGNOSIS — Z51.81 ENCOUNTER FOR MEDICATION MONITORING: ICD-10-CM

## 2025-03-03 DIAGNOSIS — F31.75 BIPOLAR 1 DISORDER, DEPRESSED, PARTIAL REMISSION (MULTI): ICD-10-CM

## 2025-03-03 DIAGNOSIS — Z12.5 ENCOUNTER FOR PROSTATE CANCER SCREENING: ICD-10-CM

## 2025-03-03 DIAGNOSIS — D68.51 HOMOZYGOUS FACTOR V LEIDEN MUTATION (MULTI): ICD-10-CM

## 2025-03-03 PROCEDURE — 99214 OFFICE O/P EST MOD 30 MIN: CPT | Performed by: STUDENT IN AN ORGANIZED HEALTH CARE EDUCATION/TRAINING PROGRAM

## 2025-03-03 PROCEDURE — 3008F BODY MASS INDEX DOCD: CPT | Performed by: STUDENT IN AN ORGANIZED HEALTH CARE EDUCATION/TRAINING PROGRAM

## 2025-03-03 PROCEDURE — 1036F TOBACCO NON-USER: CPT | Performed by: STUDENT IN AN ORGANIZED HEALTH CARE EDUCATION/TRAINING PROGRAM

## 2025-03-03 RX ORDER — FINASTERIDE 5 MG/1
5 TABLET, FILM COATED ORAL DAILY
Qty: 90 TABLET | Refills: 3 | Status: SHIPPED | OUTPATIENT
Start: 2025-03-03

## 2025-03-04 ENCOUNTER — ANTICOAGULATION - WARFARIN VISIT (OUTPATIENT)
Dept: CARDIOLOGY | Facility: CLINIC | Age: 60
End: 2025-03-04
Payer: COMMERCIAL

## 2025-03-04 DIAGNOSIS — D68.51 HOMOZYGOUS FACTOR V LEIDEN MUTATION (MULTI): Primary | ICD-10-CM

## 2025-03-04 LAB
ALBUMIN SERPL-MCNC: 4.6 G/DL (ref 3.6–5.1)
ALP SERPL-CCNC: 36 U/L (ref 35–144)
ALT SERPL-CCNC: 30 U/L (ref 9–46)
ANION GAP SERPL CALCULATED.4IONS-SCNC: 11 MMOL/L (CALC) (ref 7–17)
AST SERPL-CCNC: 22 U/L (ref 10–35)
BACTERIA UR CULT: NORMAL
BASOPHILS # BLD AUTO: 90 CELLS/UL (ref 0–200)
BASOPHILS NFR BLD AUTO: 1.4 %
BILIRUB SERPL-MCNC: 0.5 MG/DL (ref 0.2–1.2)
BUN SERPL-MCNC: 15 MG/DL (ref 7–25)
CALCIUM SERPL-MCNC: 9.7 MG/DL (ref 8.6–10.3)
CHLORIDE SERPL-SCNC: 105 MMOL/L (ref 98–110)
CO2 SERPL-SCNC: 24 MMOL/L (ref 20–32)
COLOR UR: NORMAL
CREAT SERPL-MCNC: 0.95 MG/DL (ref 0.7–1.35)
EGFRCR SERPLBLD CKD-EPI 2021: 91 ML/MIN/1.73M2
EOSINOPHIL # BLD AUTO: 198 CELLS/UL (ref 15–500)
EOSINOPHIL NFR BLD AUTO: 3.1 %
ERYTHROCYTE [DISTWIDTH] IN BLOOD BY AUTOMATED COUNT: 14.7 % (ref 11–15)
GLUCOSE SERPL-MCNC: 81 MG/DL (ref 65–99)
HCT VFR BLD AUTO: 42.7 % (ref 38.5–50)
HGB BLD-MCNC: 13.1 G/DL (ref 13.2–17.1)
LYMPHOCYTES # BLD AUTO: 1811 CELLS/UL (ref 850–3900)
LYMPHOCYTES NFR BLD AUTO: 28.3 %
MCH RBC QN AUTO: 26.7 PG (ref 27–33)
MCHC RBC AUTO-ENTMCNC: 30.7 G/DL (ref 32–36)
MCV RBC AUTO: 87 FL (ref 80–100)
MONOCYTES # BLD AUTO: 608 CELLS/UL (ref 200–950)
MONOCYTES NFR BLD AUTO: 9.5 %
NEUTROPHILS # BLD AUTO: 3693 CELLS/UL (ref 1500–7800)
NEUTROPHILS NFR BLD AUTO: 57.7 %
PLATELET # BLD AUTO: 308 THOUSAND/UL (ref 140–400)
PMV BLD REES-ECKER: 10.3 FL (ref 7.5–12.5)
POC INR: 2.4
POC PROTHROMBIN TIME: NORMAL
POTASSIUM SERPL-SCNC: 4.4 MMOL/L (ref 3.5–5.3)
PROT SERPL-MCNC: 7.3 G/DL (ref 6.1–8.1)
PSA SERPL-MCNC: 1.16 NG/ML
RBC # BLD AUTO: 4.91 MILLION/UL (ref 4.2–5.8)
SODIUM SERPL-SCNC: 140 MMOL/L (ref 135–146)
WBC # BLD AUTO: 6.4 THOUSAND/UL (ref 3.8–10.8)

## 2025-03-04 PROCEDURE — 99211 OFF/OP EST MAY X REQ PHY/QHP: CPT

## 2025-03-04 PROCEDURE — 85610 PROTHROMBIN TIME: CPT | Mod: QW

## 2025-03-04 NOTE — PROGRESS NOTES
Patient identification verified with 2 identifiers.    Location: [facility] at [facility] [address] [address] [phone] option #1     Referring Physician: DR. DOMINIK SPRINGER  Enrollment/ Re-enrollment date: 10/15/2025   INR Goal: 2.0-3.0  INR monitoring is per AMS protocol.  Anticoagulation Medication: warfarin  Indication:  FACTOR V LEIDEN    Subjective   Bleeding signs/symptoms: No    Bruising: No   Major bleeding event: No  Thrombosis signs/symptoms: No  Thromboembolic event: No  Missed doses: No  Extra doses: No  Medication changes: No  Dietary changes: No  Change in health: No  Change in activity: No  Alcohol: No  Other concerns: No    Upcoming Procedures:  Does the Patient Have any upcoming procedures that require interruption in anticoagulation therapy? no  Does the patient require bridging? no      Anticoagulation Summary  As of 3/4/2025      INR goal:  2.0-3.0   TTR:  85.6% (4.4 mo)   INR used for dosin.40 (3/4/2025)   Weekly warfarin total:  65 mg               Assessment/Plan   Therapeutic     1. New dose: no change    2. Next INR:  6 WEEKS      Education provided to patient during the visit:  Patient instructed to call in interim with questions, concerns and changes.   Patient educated on compliance with dosing, follow up appointments, and prescribed plan of care.

## 2025-03-05 ENCOUNTER — HOSPITAL ENCOUNTER (OUTPATIENT)
Dept: RADIOLOGY | Facility: CLINIC | Age: 60
Discharge: HOME | End: 2025-03-05
Payer: COMMERCIAL

## 2025-03-05 DIAGNOSIS — R35.0 BENIGN PROSTATIC HYPERPLASIA WITH URINARY FREQUENCY: ICD-10-CM

## 2025-03-05 DIAGNOSIS — N40.1 BENIGN PROSTATIC HYPERPLASIA WITH URINARY FREQUENCY: ICD-10-CM

## 2025-03-05 PROCEDURE — 76857 US EXAM PELVIC LIMITED: CPT

## 2025-03-12 DIAGNOSIS — R35.0 BENIGN PROSTATIC HYPERPLASIA WITH URINARY FREQUENCY: ICD-10-CM

## 2025-03-12 DIAGNOSIS — N40.1 BENIGN PROSTATIC HYPERPLASIA WITH URINARY FREQUENCY: ICD-10-CM

## 2025-03-12 RX ORDER — FINASTERIDE 5 MG/1
5 TABLET, FILM COATED ORAL DAILY
Qty: 90 TABLET | Refills: 0 | Status: SHIPPED | OUTPATIENT
Start: 2025-03-12 | End: 2025-03-16 | Stop reason: SDUPTHER

## 2025-03-14 ENCOUNTER — OFFICE VISIT (OUTPATIENT)
Dept: OPHTHALMOLOGY | Facility: CLINIC | Age: 60
End: 2025-03-14
Payer: COMMERCIAL

## 2025-03-14 DIAGNOSIS — H52.31 ANISOMETROPIA: ICD-10-CM

## 2025-03-14 DIAGNOSIS — Z98.890 HISTORY OF RADIAL KERATOTOMY: Primary | ICD-10-CM

## 2025-03-14 DIAGNOSIS — H52.213 IRREGULAR ASTIGMATISM OF BOTH EYES: ICD-10-CM

## 2025-03-14 DIAGNOSIS — H11.151 PINGUECULA OF RIGHT EYE: ICD-10-CM

## 2025-03-14 DIAGNOSIS — H18.712 CORNEAL ECTASIA OF LEFT EYE: ICD-10-CM

## 2025-03-14 LAB
CENTRAL CORNEA THICKNESS (OD): 565 MICRONS
CENTRAL CORNEA THICKNESS (OS): 535 MICRONS
KMAX (OD): 52.4 DIOPTERS
KMAX (OS): 66.5 DIOPTERS
PACH THINNEST (OS): 422 MICRONS
SIMK FLAT (OD): 34.8 DIOPTERS
SIMK FLAT (OS): 32.5 DIOPTERS
SIMK STEEP (OD): 37.3 DIOPTERS
SIMK STEEP (OS): 35.1 DIOPTERS

## 2025-03-14 PROCEDURE — 92014 COMPRE OPH EXAM EST PT 1/>: CPT | Performed by: OPTOMETRIST

## 2025-03-14 PROCEDURE — 92015 DETERMINE REFRACTIVE STATE: CPT | Performed by: OPTOMETRIST

## 2025-03-14 PROCEDURE — 1036F TOBACCO NON-USER: CPT | Performed by: OPTOMETRIST

## 2025-03-14 PROCEDURE — 92310 CONTACT LENS FITTING OU: CPT | Performed by: OPTOMETRIST

## 2025-03-14 PROCEDURE — 92025 CPTRIZED CORNEAL TOPOGRAPHY: CPT | Performed by: OPTOMETRIST

## 2025-03-14 RX ORDER — SODIUM CHLORIDE FOR INHALATION 0.9 %
VIAL, NEBULIZER (ML) INHALATION
Qty: 500 ML | Refills: 3 | Status: SHIPPED | OUTPATIENT
Start: 2025-03-14

## 2025-03-14 ASSESSMENT — REFRACTION_MANIFEST
OS_CYLINDER: -3.00
OD_AXIS: 060
OS_AXIS: 124
METHOD_AUTOREFRACTION: 1
OS_SPHERE: +8.50
OD_SPHERE: +1.75
OS_SPHERE: +5.50
OD_CYLINDER: -2.00
OD_CYLINDER: -2.00
OD_ADD: +2.00
OD_ADD: +2.00
OD_AXIS: 075
OD_SPHERE: +1.75
OS_CYLINDER: -5.75
OS_ADD: +2.00
OS_ADD: +2.00
OS_AXIS: 120

## 2025-03-14 ASSESSMENT — REFRACTION_CURRENTRX
OD_BRAND: SYNERGEYES VS
OS_BASECURVE: 8.4
OS_ADDL_SPECS: 3400 36-42
OD_DIAMETER: 14.5
OS_CYLINDER: -1.00
OD_ADDL_SPECS: 3400 36-42
OS_AXIS: 155
OD_CYLINDER: SPHERE
OD_SPHERE: -3.50
OS_SPHERE: +0.25
OS_BRAND: SYNERGEYES VS
OS_DIAMETER: 15.5
OD_BASECURVE: 8.4

## 2025-03-14 ASSESSMENT — ENCOUNTER SYMPTOMS
EYES NEGATIVE: 0
NEUROLOGICAL NEGATIVE: 0
ENDOCRINE NEGATIVE: 0
GASTROINTESTINAL NEGATIVE: 0
HEMATOLOGIC/LYMPHATIC NEGATIVE: 0
ALLERGIC/IMMUNOLOGIC NEGATIVE: 0
CARDIOVASCULAR NEGATIVE: 0
PSYCHIATRIC NEGATIVE: 0
MUSCULOSKELETAL NEGATIVE: 0
CONSTITUTIONAL NEGATIVE: 0
RESPIRATORY NEGATIVE: 0

## 2025-03-14 ASSESSMENT — CONF VISUAL FIELD
OS_SUPERIOR_TEMPORAL_RESTRICTION: 0
OS_INFERIOR_TEMPORAL_RESTRICTION: 0
OD_NORMAL: 1
OD_INFERIOR_NASAL_RESTRICTION: 0
OD_SUPERIOR_NASAL_RESTRICTION: 0
OS_NORMAL: 1
OS_SUPERIOR_NASAL_RESTRICTION: 0
METHOD: COUNTING FINGERS
OD_SUPERIOR_TEMPORAL_RESTRICTION: 0
OD_INFERIOR_TEMPORAL_RESTRICTION: 0
OS_INFERIOR_NASAL_RESTRICTION: 0

## 2025-03-14 ASSESSMENT — VISUAL ACUITY
CORRECTION_TYPE: GLASSES
OD_CC: 20/20
OS_CC: 20/25
VA_OR_OS_CURRENT_RX: 20/25-2
OD_CC+: -1
METHOD: SNELLEN - LINEAR
OS_CC+: -2+1
VA_OR_OD_CURRENT_RX: 20/20

## 2025-03-14 ASSESSMENT — REFRACTION_WEARINGRX
OD_SPHERE: +1.50
OS_CYLINDER: -3.00
OD_AXIS: 065
OS_ADD: +2.00
OS_SPHERE: +5.50
OD_ADD: +2.00
OS_AXIS: 120
OD_CYLINDER: -2.50

## 2025-03-14 ASSESSMENT — SLIT LAMP EXAM - LIDS
COMMENTS: CLEAN AND CLEAR
COMMENTS: CLEAN AND CLEAR

## 2025-03-14 ASSESSMENT — EXTERNAL EXAM - LEFT EYE: OS_EXAM: NORMAL

## 2025-03-14 ASSESSMENT — TONOMETRY
IOP_METHOD: GOLDMANN APPLANATION
OS_IOP_MMHG: 16
OD_IOP_MMHG: 16

## 2025-03-14 ASSESSMENT — EXTERNAL EXAM - RIGHT EYE: OD_EXAM: NORMAL

## 2025-03-14 ASSESSMENT — CUP TO DISC RATIO
OD_RATIO: 0.3
OS_RATIO: 0.3

## 2025-03-14 NOTE — PROGRESS NOTES
Assessment/Plan   Diagnoses and all orders for this visit:  History of radial keratotomy  -     sodium chloride 0.9 % nebulizer solution; (100) 5 ml non-preserved vials, Use off-label to insert into bowl of scleral contact lens prior to insertion  Irregular astigmatism of both eyes  -     Corneal Topography - OU - Both Eyes  -     sodium chloride 0.9 % nebulizer solution; (100) 5 ml non-preserved vials, Use off-label to insert into bowl of scleral contact lens prior to insertion  Anisometropia  Corneal ectasia of left eye  Pinguecula of right eye      Managing well with current CL especially in Right eye with pinguecula, no signs of inflammation with smaller OAD Right eye has hydrapeg Both eyes, add HydraBoost q1-2 months    A spectacle prescription was dispensed to be used as needed.  Match last successful spec Rx, patient could not adapt to previous Rx     Follow up annually

## 2025-03-17 ENCOUNTER — APPOINTMENT (OUTPATIENT)
Dept: PRIMARY CARE | Facility: CLINIC | Age: 60
End: 2025-03-17
Payer: COMMERCIAL

## 2025-03-17 DIAGNOSIS — F51.01 PRIMARY INSOMNIA: ICD-10-CM

## 2025-03-17 DIAGNOSIS — N40.1 BENIGN PROSTATIC HYPERPLASIA WITH URINARY FREQUENCY: Primary | ICD-10-CM

## 2025-03-17 DIAGNOSIS — R35.0 BENIGN PROSTATIC HYPERPLASIA WITH URINARY FREQUENCY: Primary | ICD-10-CM

## 2025-03-17 PROCEDURE — 1036F TOBACCO NON-USER: CPT | Performed by: STUDENT IN AN ORGANIZED HEALTH CARE EDUCATION/TRAINING PROGRAM

## 2025-03-17 PROCEDURE — 99214 OFFICE O/P EST MOD 30 MIN: CPT | Performed by: STUDENT IN AN ORGANIZED HEALTH CARE EDUCATION/TRAINING PROGRAM

## 2025-03-17 RX ORDER — HYDROXYZINE PAMOATE 50 MG/1
50 CAPSULE ORAL NIGHTLY PRN
Qty: 30 CAPSULE | Refills: 0 | Status: SHIPPED | OUTPATIENT
Start: 2025-03-17

## 2025-03-17 RX ORDER — OXYBUTYNIN CHLORIDE 5 MG/1
5 TABLET ORAL 2 TIMES DAILY
Qty: 180 TABLET | Refills: 3 | Status: SHIPPED | OUTPATIENT
Start: 2025-03-17

## 2025-03-17 ASSESSMENT — ENCOUNTER SYMPTOMS
DIZZINESS: 0
DYSURIA: 0
FREQUENCY: 1
EYE PAIN: 0
SHORTNESS OF BREATH: 0
RHINORRHEA: 0
LIGHT-HEADEDNESS: 0
FLANK PAIN: 0
FEVER: 0
UNEXPECTED WEIGHT CHANGE: 0
ABDOMINAL PAIN: 0
CHILLS: 0
HEMATURIA: 0
DIFFICULTY URINATING: 1

## 2025-03-17 NOTE — ASSESSMENT & PLAN NOTE
-Flomax 0.4mg nightly was prescribed 10/9/2024 due to BPH with nocturia.  Unfortunately, he has had dizziness and lightheadedness, even with Flomax 0.2 mg.  -Past bladder ultrasound/PVR showed urinary intention.  For this reason, I would avoid oxybutynin.  -Bladder Ultrasound on 3/5/2025 showed prostate volume of 48ML.  -Started Finasteride 3/4/2025.  Patient has not experienced relief of his symptoms.  It could be due to the fact that he has not been on the medication for very long.  -Started oxybutynin on 3/17/2025 for bladder relief now that we know that patient does not have urinary retention.  -I explained to the patient t that finasteride is reserved for significantly enlarged prostates (prostate volume >30 mL, prostate-specific antigen >1.5 ng/mL, or palpable prostate enlargement on digital rectal exam) or those with hematuria associated with benign prostatic hyperplasia.

## 2025-03-17 NOTE — PROGRESS NOTES
Telehealth Disclaimer: I performed this visit using real-time telehealth tools, including an audio/video telephone connection between patient, who was located at his/her residence in the [facility] and myself in my  office in the [facility]. Verbal consent was obtained from the patient.  Patient understands the limitations of the visit, including limitations in physical examination and all issues may not be able to be addressed. We reviewed precautions when to seek immediate in-person medical care. Visit encounter lasted 15 minutes.      Subjective       Patient ID: Sukhjinder Cedeno is a 59 y.o. male who presents today for nocturia,  Factor 5 leiden mutation, and Bipolar Disorder.    Initial PCP history 10/9/2024: This is the first time I am meeting the patient.  Urinary/?BPH HPI:  He gets up 4-times in the middle of the night for the past 2-3 years. No urinary hesitancy, but a weekend stream, no dribbling.     Interval Updates 3/3/2025:  Patient presents for follow-up for BPH.  When he takes tamsulosin he feels lightheaded, particularly when changing positions and exerting himself too much.  He denies room spinning, self spinning, facial droop, other focal neurologic deficits.  He has frequent urination throughout the day, slightly even more at night. He denies dysuria or hematuria.    Interval History 3/17/2025:  I reviewed the bladder ultrasound results with the patient.  He is still having urinary frequency, and he started the finasteride 2 weeks ago.  Symptoms occur throughout the day, but more frequent at night.  He denies any dysuria or hematuria.    Current Diet: regular diet    Current Physical Activity: walking    Tobacco/Alcohol/Drug Use:   Social History     Tobacco Use    Smoking status: Former     Types: Cigarettes    Smokeless tobacco: Never   Substance Use Topics    Alcohol use: Not on file       Required Screenings/Immunizations:   Health Maintenance Due   Topic Date Due    HIV Screening  Never  done    Hepatitis C Screening  Never done    Diabetes Screening  Never done    MMR Vaccines (1 of 1 - Standard series) Never done    Pneumococcal Vaccine (1 of 1 - PCV) 06/01/2013    RSV High Risk: (Elderly (60+) or Pregnant Population) (1 - Risk 60-74 years 1-dose series) Never done    COVID-19 Vaccine (8 - 2024-25 season) 09/01/2024       Problems to be addressed today in addition to Preventative Services: As stated in orders.     Review of Systems   Constitutional:  Negative for chills, fever and unexpected weight change.   HENT:  Negative for rhinorrhea.    Eyes:  Negative for pain.   Respiratory:  Negative for shortness of breath.    Cardiovascular:  Negative for chest pain.   Gastrointestinal:  Negative for abdominal pain.   Genitourinary:  Positive for difficulty urinating and frequency (Nocturia). Negative for dysuria, flank pain, hematuria and penile pain.   Skin:  Negative for rash.   Neurological:  Negative for dizziness, syncope and light-headedness.   Psychiatric/Behavioral:  Negative for behavioral problems and suicidal ideas.        There were no vitals taken for this visit.     Objective   Physical Exam  Vitals reviewed.   Constitutional:       General: He is not in acute distress.  Pulmonary:      Comments: No conversational dyspnea.  Neurological:      Mental Status: He is alert.           Labs:   Lab Results   Component Value Date    WBC 6.4 03/03/2025    HGB 13.1 (L) 03/03/2025    HCT 42.7 03/03/2025     03/03/2025    TSH 1.79 03/07/2023    PSA 1.16 03/03/2025    INR 2.40 03/04/2025     Lab Results   Component Value Date     03/03/2025    K 4.4 03/03/2025     03/03/2025    BUN 15 03/03/2025    CREATININE 0.95 03/03/2025    GLUCOSE 81 03/03/2025    CALCIUM 9.7 03/03/2025    PROT 7.3 03/03/2025    BILITOT 0.5 03/03/2025    ALKPHOS 36 03/03/2025    AST 22 03/03/2025    ALT 30 03/03/2025    GLOB 3.3 06/27/2023     Lab Results   Component Value Date    CHOL 214 (H) 03/07/2023     "  Lab Results   Component Value Date    TRIG 149 03/07/2023      Lab Results   Component Value Date    HDL 43.9 03/07/2023     No results found for: \"LDLCALC\"   Lab Results   Component Value Date    VLDL 30 03/07/2023    No components found for: \"CHOLHDLRATI0\"    Imaging/Testing: Corneal Topography - OU - Both Eyes  Right Eye  Progression has been stable. Findings include irregular astigmatism,   previous refractive surgery. SimK Steep was 37.3 diopters. SimK Flat was   34.8 diopters. Central cornea thickness was 565 microns. K Max was 52.4   diopters.     Left Eye  Progression has been stable. Findings include irregular astigmatism,   previous refractive surgery. SimK Steep was 35.1 diopters. SimK Flat was   32.5 diopters. Central cornea thickness was 535 microns. K Max was 66.5   diopters. Pach thinnest was 422 microns.     Notes  OD: central thinning    OS: patch of extreme thinning inferior nasal, ectasia    Assessment/Plan   Problem List Items Addressed This Visit          Medium    Benign prostatic hyperplasia with urinary frequency - Primary     -Flomax 0.4mg nightly was prescribed 10/9/2024 due to BPH with nocturia.  Unfortunately, he has had dizziness and lightheadedness, even with Flomax 0.2 mg.  -Past bladder ultrasound/PVR showed urinary intention.  For this reason, I would avoid oxybutynin.  -Bladder Ultrasound on 3/5/2025 showed prostate volume of 48ML.  -Started Finasteride 3/4/2025.  Patient has not experienced relief of his symptoms.  It could be due to the fact that he has not been on the medication for very long.  -Started oxybutynin on 3/17/2025 for bladder relief now that we know that patient does not have urinary retention.  -I explained to the patient t that finasteride is reserved for significantly enlarged prostates (prostate volume >30 mL, prostate-specific antigen >1.5 ng/mL, or palpable prostate enlargement on digital rectal exam) or those with hematuria associated with benign prostatic " hyperplasia.           Relevant Medications    oxybutynin (Ditropan) 5 mg tablet     Other Visit Diagnoses       Primary insomnia        Relevant Medications    hydrOXYzine pamoate (VistariL) 50 mg capsule                  As part of today's Preventative Visit, an age and gender-appropriate history and physical was performed, as documented below. Counseling and anticipatory guidance were performed, and risk factor reduction interventions (including [place] Preventative Services Task Force recommended screening tests) were utilized/ordered as outlined in the above Assessment and Plan. All patient medications were reviewed, and refilled if necessary.    Patient and I discussed diet/nutrition, lifestyle modifications, safety, medication indications and side effects, and health goals.    current treatment plan is effective, no change in therapy, orders and follow up as documented in EMR, lab results reviewed with patient, repeat labs ordered prior to next appointment, reviewed compliance with lifestyle measures, reviewed diet, exercise and weight control, reviewed medications and side effects in detail         I have reviewed OARRS report, consistent with prescribed medication. I have considered risks of abuse, diversion, side effects and feel clinically benefit of medication outweighs risks at this time.      I spent 30 minutes in duration for this visit today. This time consisted of chart review, obtaining history, and/or performing the exam as documented above as well as documenting the clinical information for the encounter in the electronic record, discussing treatment options, plans, and/or goals with patient, family, and/or caregiver, refilling medications, updating the electronic record, ordering medicines, lab work, imaging, referrals, and/or procedures as documented above and communicating with other [facility].

## 2025-03-19 ENCOUNTER — APPOINTMENT (OUTPATIENT)
Dept: PRIMARY CARE | Facility: CLINIC | Age: 60
End: 2025-03-19
Payer: COMMERCIAL

## 2025-04-11 DIAGNOSIS — F51.01 PRIMARY INSOMNIA: ICD-10-CM

## 2025-04-11 RX ORDER — HYDROXYZINE PAMOATE 50 MG/1
50 CAPSULE ORAL NIGHTLY PRN
Qty: 90 CAPSULE | Refills: 3 | Status: SHIPPED | OUTPATIENT
Start: 2025-04-11

## 2025-04-14 ENCOUNTER — APPOINTMENT (OUTPATIENT)
Dept: INFECTIOUS DISEASES | Facility: CLINIC | Age: 60
End: 2025-04-14
Payer: COMMERCIAL

## 2025-04-14 VITALS
HEART RATE: 52 BPM | HEIGHT: 71 IN | WEIGHT: 169.6 LBS | TEMPERATURE: 97.1 F | OXYGEN SATURATION: 98 % | SYSTOLIC BLOOD PRESSURE: 117 MMHG | BODY MASS INDEX: 23.74 KG/M2 | DIASTOLIC BLOOD PRESSURE: 73 MMHG

## 2025-04-14 DIAGNOSIS — Z91.89 AT RISK FOR NAUSEA: ICD-10-CM

## 2025-04-14 DIAGNOSIS — Z71.84 COUNSELING FOR TRAVEL: Primary | ICD-10-CM

## 2025-04-14 PROCEDURE — 90691 TYPHOID VACCINE IM: CPT | Performed by: STUDENT IN AN ORGANIZED HEALTH CARE EDUCATION/TRAINING PROGRAM

## 2025-04-14 PROCEDURE — 99202U03 TRAVEL CONSULT (U03): Performed by: STUDENT IN AN ORGANIZED HEALTH CARE EDUCATION/TRAINING PROGRAM

## 2025-04-14 PROCEDURE — 90632 HEPA VACCINE ADULT IM: CPT | Performed by: STUDENT IN AN ORGANIZED HEALTH CARE EDUCATION/TRAINING PROGRAM

## 2025-04-14 PROCEDURE — 90471U01 TYPHOID VICPS VACCINE IM: Performed by: STUDENT IN AN ORGANIZED HEALTH CARE EDUCATION/TRAINING PROGRAM

## 2025-04-14 PROCEDURE — 90471 IMMUNIZATION ADMIN: CPT | Performed by: STUDENT IN AN ORGANIZED HEALTH CARE EDUCATION/TRAINING PROGRAM

## 2025-04-14 RX ORDER — ONDANSETRON 4 MG/1
4 TABLET, ORALLY DISINTEGRATING ORAL EVERY 8 HOURS PRN
Qty: 20 TABLET | Refills: 0 | Status: SHIPPED | OUTPATIENT
Start: 2025-04-14 | End: 2025-04-21

## 2025-04-14 RX ORDER — ATOVAQUONE AND PROGUANIL HYDROCHLORIDE 250; 100 MG/1; MG/1
1 TABLET, FILM COATED ORAL DAILY
Qty: 14 TABLET | Refills: 0 | Status: SHIPPED | OUTPATIENT
Start: 2025-04-14 | End: 2025-04-28

## 2025-04-14 ASSESSMENT — PAIN SCALES - GENERAL: PAINLEVEL_OUTOF10: 0-NO PAIN

## 2025-04-14 NOTE — PROGRESS NOTES
Traveling to [facility] ([facility], [facility] (5 days), and [place]) with wife from 5/10-5/25/25.    Ordered HepA and Typhoid ViCPs vaccines.    Prescribed Malarone (14 doses for [facility]) and ondansetron for nausea (at patient's request).  Pt is on warfarin which has contra-indications with both cipro and azithromycin so  neither antibiotic was prescribed.    Discussed food, water, and insect precautions.

## 2025-04-14 NOTE — PATIENT INSTRUCTIONS
You were seen today for your upcoming trip.    For your country specific issues, please refer back to the [employer] handouts supplied to you in the travel brochure folder that we provided to you at your visit.  These are updated daily, if necessary, by the reporting agencies, so are a valuable source of information for your trip.    This brief summary may not contain all of the items that we discussed today.  Please review the handouts given to you as well.    ** Please be sure to carry any medications with you in your carry-on luggage in the event that your luggage is delayed or lost during your travels.    ** For your trip, as we discussed, standard food and water precautions apply.     You should avoid drinking any water that is not bottled.  Alcoholic or carbonated beverages are safe to drink.  Any beverage that has been brought to a rolling boil for  3 minutes is also safe to drink (once it has cooled some).  Commercially purchased milk products that are boxed (may be on store shelves) or refrigerated, are pasteurized and therefore safe to drink as long as they are refrigerated after opening.  Juices that are prepared commercially (in boxes or individual bottles) are also safe to drink as they are pasteurized as well.  Avoid road-side juice vendors as the juice may be diluted with contaminated water or produced from unclean fruit.  Regarding food precautions, you want to make sure that meats are well cooked.   Avoid eating fresh fruits and vegetables raw with the skin intact.  Peel before eating.  Avoid salads due to possible contamination by contaminated water.  Avoid any unpasteurized cheeses (they typically are very white in appearance)    ** We recommend that you use insect repellant to help you prevent infections caused by biting insects such as mosquitoes, flies, ticks, fleas and chiggers.  This includes protection against Zika virus, Dengue virus, Chikungunya and Malaria, just to name a few.    For insect  "repellents that are applied directly to the skin, we recommend DEET containing repellants with a percentage between 20-40%.  Apply to skin exposed surfaces only, every 6-8 hours throughout the day.  I typically recommend applying before breakfast, lunch and dinner as these are natural breaks in your day.  Wash your hands after applying. Apply again in the evening.  You must reapply after bathing or swimming as it is washed away with water.  Apply after sunscreen products are applied. DEET containing repellants protect against all concerning insects with the exception of hornets, wasps or bees. Activity against ticks only lasts for 2 hours. For additional Tick precautions while hiking, tuck your pant legs into your socks as this will prevent ticks from crawling up your shoes / socks onto your legs while walking. Perform a \"tick check\" at least once daily, at the end of your day.  Check in the sporting goods areas of most stores for these products.  A recommended alternative, Picardin ,may also be used every 8 hours.  If you are unable to locate the product in stores, try on-line stores as well.      PERMETHRIN SPRAY  is an additional option and is for application to clothing and garments only.  This can be applied to hats, bandanas, socks, boots and any clothing items to prevent insect attention.  I can be applied before you leave and will remain active through many washes and for up to 6 weeks in unwashed clothing.  Read any packaging for full specifications. Apply in an open area as when wet, it has an odor. Once dry, it typically has no odor and does not stain clothing. Regular repellants should be applied to exposed skin however.  Permethrin may only be available on-line.     **  As a general safety procedure, we recommend that you scan a copy of your passport, any travel required documents (yellow fever vaccine card), and itinerary and email them to yourself.  Keep these in a special travel folder for reference " in the event that your travel documents are misplaced or stolen while abroad.  You should also leave a detailed copy of your itinerary with a friend or relative at home for reference as well.  If traveling for long periods, an international [employer] card or global plan for your cellular service may be beneficial for communication. This list is not meant to be all inclusive.      **  Please review and understand any cultural aspects of the countries that you will be visiting to ensure that appropriate dress and behaviors are understood.  ENJOY YOUR TRIP (o:      **  Make sure to come back to complete any vaccine series that may have been started today.  This will ensure full vaccine efficacy and protection for future travel.    Today, you received the Hepatitis A vaccine, which will protect you from Hepatitis A for your trip, and the Typhoid vaccine, which is good for 2 years.  I sent prescriptions for malaria prevention medicine, and ondansetron for nausea to your pharmacy.  Have a wonderful trip!

## 2025-04-15 ENCOUNTER — ANTICOAGULATION - WARFARIN VISIT (OUTPATIENT)
Dept: CARDIOLOGY | Facility: CLINIC | Age: 60
End: 2025-04-15
Payer: COMMERCIAL

## 2025-04-15 DIAGNOSIS — D68.51 HOMOZYGOUS FACTOR V LEIDEN MUTATION (MULTI): Primary | ICD-10-CM

## 2025-04-15 LAB
POC INR: 3.6
POC PROTHROMBIN TIME: NORMAL

## 2025-04-15 PROCEDURE — 85610 PROTHROMBIN TIME: CPT | Mod: QW

## 2025-04-15 PROCEDURE — 99211 OFF/OP EST MAY X REQ PHY/QHP: CPT

## 2025-04-15 NOTE — PROGRESS NOTES
Patient identification verified with 2 identifiers.    Location: [facility] at [facility] - [address] [address] [phone] option #1     Referring Physician: DR. NAPOLEON SPRINGER  Enrollment/ Re-enrollment date: 10/15/2025   INR Goal: 2.0-3.0  INR monitoring is per AMS protocol.  Anticoagulation Medication: warfarin  Indication:  FACTOR V LEIDEN    Subjective   Bleeding signs/symptoms: No    Bruising: No   Major bleeding event: No  Thrombosis signs/symptoms: No  Thromboembolic event: No  Missed doses: No  Extra doses: No  Medication changes: Yes  PT STARTED FENASTRIDE AND VISTARIL 1 MONTH AGO. PER INTERNET NEITHER MEDICATION INTERACT WITH WARFARIN  Dietary changes: No  Change in health: No  Change in activity: No  Alcohol: No  Other concerns: No    Upcoming Procedures:  Does the Patient Have any upcoming procedures that require interruption in anticoagulation therapy? no  Does the patient require bridging? no      Anticoagulation Summary  As of 4/15/2025      INR goal:  2.0-3.0   TTR:  77.0% (5.8 mo)   INR used for dosing:  3.60 (4/15/2025)   Weekly warfarin total:  65 mg               Assessment/Plan   Supratherapeutic     1. New dose:  PT HAS BEEN THERAPEUTIC ON THIS DOSE.PT TOOK TODAYS WARFARIN ALREADY, WILL HOLD TOMORROWS DOSE THEN MAINTAIN.     2. Next INR: 1 week      Education provided to patient during the visit:  Patient instructed to call in interim with questions, concerns and changes.   Patient educated on interactions between medications and warfarin.   Patient educated on compliance with dosing, follow up appointments, and prescribed plan of care.

## 2025-04-22 ENCOUNTER — ANTICOAGULATION - WARFARIN VISIT (OUTPATIENT)
Dept: CARDIOLOGY | Facility: CLINIC | Age: 60
End: 2025-04-22
Payer: COMMERCIAL

## 2025-04-22 DIAGNOSIS — D68.51 HOMOZYGOUS FACTOR V LEIDEN MUTATION (MULTI): Primary | ICD-10-CM

## 2025-04-22 LAB
POC INR: 2.8 (ref 0.9–1.1)
POC PROTHROMBIN TIME: ABNORMAL (ref 9.3–12.5)

## 2025-04-22 PROCEDURE — 85610 PROTHROMBIN TIME: CPT | Mod: QW

## 2025-04-22 PROCEDURE — 99211 OFF/OP EST MAY X REQ PHY/QHP: CPT

## 2025-04-22 NOTE — PROGRESS NOTES
Patient identification verified with 2 identifiers.    Location: [facility] at [facility] - [address] [address] [phone] option #1     Referring Physician: NAPOLEON SPRINGER  Enrollment/ Re-enrollment date: 10/15/2025   INR Goal: 2.0-3.0  INR monitoring is per AMS protocol.  Anticoagulation Medication: warfarin  Indication: Hyper Coaguable State/FACTOR V LIDEN    Subjective   Bleeding signs/symptoms: No    Bruising: No   Major bleeding event: No  Thrombosis signs/symptoms: No  Thromboembolic event: No  Missed doses: No  Extra doses: No  Medication changes: No  Dietary changes: No  Change in health: No  Change in activity: No  Alcohol: No  Other concerns: No    Upcoming Procedures:  Does the Patient Have any upcoming procedures that require interruption in anticoagulation therapy? no  Does the patient require bridging? no      Anticoagulation Summary  As of 2025      INR goal:  2.0-3.0   TTR:  74.9% (6.1 mo)   INR used for dosin.80 (2025)   Weekly warfarin total:  65 mg               Assessment/Plan   Therapeutic     1. New dose: no change    2. Next INR:  6 WEEKS, PER REFERRAL      Education provided to patient during the visit:  Patient instructed to call in interim with questions, concerns and changes.   Patient educated on interactions between medications and warfarin.   Patient educated on dietary consistency in vitamin k consumption.   Patient educated on affects of alcohol consumption while taking warfarin.   Patient educated on signs of bleeding/clotting.

## 2025-04-28 PROBLEM — G47.09 OTHER INSOMNIA: Status: ACTIVE | Noted: 2025-04-28

## 2025-05-07 ENCOUNTER — APPOINTMENT (OUTPATIENT)
Dept: PRIMARY CARE | Facility: CLINIC | Age: 60
End: 2025-05-07
Payer: COMMERCIAL

## 2025-06-03 ENCOUNTER — ANTICOAGULATION - WARFARIN VISIT (OUTPATIENT)
Dept: CARDIOLOGY | Facility: CLINIC | Age: 60
End: 2025-06-03
Payer: COMMERCIAL

## 2025-06-03 DIAGNOSIS — D68.51 HOMOZYGOUS FACTOR V LEIDEN MUTATION (MULTI): Primary | ICD-10-CM

## 2025-06-03 LAB
POC INR: 1.7 (ref 0.9–1.1)
POC PROTHROMBIN TIME: ABNORMAL (ref 9.3–12.5)

## 2025-06-03 PROCEDURE — 85610 PROTHROMBIN TIME: CPT | Mod: QW

## 2025-06-03 PROCEDURE — 99211 OFF/OP EST MAY X REQ PHY/QHP: CPT

## 2025-06-03 NOTE — PROGRESS NOTES
Patient identification verified with 2 identifiers.    Location: [facility] at [facility] - [address] [address] [phone] option #1     Referring Physician: DR. SPRINGER  Enrollment/ Re-enrollment date: 10/15/25   INR Goal: 2.0-3.0  INR monitoring is per AMS protocol.  Anticoagulation Medication: warfarin  Indication: Hyper Coaguable State    Subjective   Bleeding signs/symptoms: No    Bruising: No   Major bleeding event: No  Thrombosis signs/symptoms: No  Thromboembolic event: No  Missed doses: No SEE NOTE  Extra doses: No  Medication changes: No  PT ATE MORE VITAMIN K  THAN USUAL IN THE PAST FEW DAYS.  HE WILL NOT EAT ANY TODAY, THEN RESUME CONSISTENCY.  Dietary changes: Yes  Change in health: No  Change in activity: No  Alcohol: No  Other concerns: No    Upcoming Procedures:  Does the Patient Have any upcoming procedures that require interruption in anticoagulation therapy? no  Does the patient require bridging? no      Anticoagulation Summary  As of 6/3/2025      INR goal:  2.0-3.0   TTR:  74.5% (7.5 mo)   INR used for dosin.70 (6/3/2025)   Weekly warfarin total:  65 mg               Assessment/Plan   Subtherapeutic     1. New dose: no change  PT ALREADY TOOK HIS DOSE OF WARFARIN THIS AM.   2. Next INR: 1 week      Education provided to patient during the visit:  Patient instructed to call in interim with questions, concerns and changes.   Patient educated on interactions between medications and warfarin.   Patient educated on dietary consistency in vitamin k consumption.   Patient educated on signs of bleeding/clotting.

## 2025-06-10 ENCOUNTER — ANTICOAGULATION - WARFARIN VISIT (OUTPATIENT)
Dept: CARDIOLOGY | Facility: CLINIC | Age: 60
End: 2025-06-10
Payer: COMMERCIAL

## 2025-06-10 DIAGNOSIS — D68.51 HOMOZYGOUS FACTOR V LEIDEN MUTATION (MULTI): Primary | ICD-10-CM

## 2025-06-10 LAB
POC INR: 2.2 (ref 0.9–1.1)
POC PROTHROMBIN TIME: ABNORMAL (ref 9.3–12.5)

## 2025-06-10 PROCEDURE — 85610 PROTHROMBIN TIME: CPT | Mod: QW

## 2025-06-10 PROCEDURE — 99211 OFF/OP EST MAY X REQ PHY/QHP: CPT

## 2025-06-10 NOTE — PROGRESS NOTES
Patient identification verified with 2 identifiers.    Location: [facility] at [facility] - [address] [address] [phone] option #1     Referring Physician: DR. SPRINGER  Enrollment/ Re-enrollment date: 10/15/25   INR Goal: 2.0-3.0  INR monitoring is per [facility] protocol.  Anticoagulation Medication: warfarin  Indication: Hyper Coaguable State    Subjective   Bleeding signs/symptoms: No    Bruising: No   Major bleeding event: No  Thrombosis signs/symptoms: No  Thromboembolic event: No  Missed doses: No SEE NOTE  Extra doses: No  Medication changes: No  Dietary changes: No  Change in health: No  Change in activity: No  Alcohol: No  Other concerns: No    Upcoming Procedures:  Does the Patient Have any upcoming procedures that require interruption in anticoagulation therapy? no  Does the patient require bridging? no      Anticoagulation Summary  As of 6/10/2025      INR goal:  2.0-3.0   TTR:  73.5% (7.7 mo)   INR used for dosin.20 (6/10/2025)   Weekly warfarin total:  65 mg               Assessment/Plan   Therapeutic  Maintain TWD  RTC in 6 weeks      Education provided to patient during the visit:  Patient instructed to call in interim with questions, concerns and changes.   Patient educated on interactions between medications and warfarin.   Patient educated on dietary consistency in vitamin k consumption.   Patient educated on signs of bleeding/clotting.

## 2025-06-20 ENCOUNTER — APPOINTMENT (OUTPATIENT)
Dept: OPHTHALMOLOGY | Facility: CLINIC | Age: 60
End: 2025-06-20
Payer: COMMERCIAL

## 2025-07-22 ENCOUNTER — ANTICOAGULATION - WARFARIN VISIT (OUTPATIENT)
Dept: CARDIOLOGY | Facility: CLINIC | Age: 60
End: 2025-07-22
Payer: COMMERCIAL

## 2025-07-22 DIAGNOSIS — D68.51 HOMOZYGOUS FACTOR V LEIDEN MUTATION (MULTI): Primary | ICD-10-CM

## 2025-07-22 LAB
POC INR: 2.3 (ref 0.9–1.1)
POC PROTHROMBIN TIME: ABNORMAL (ref 9.3–12.5)

## 2025-07-22 PROCEDURE — 99211 OFF/OP EST MAY X REQ PHY/QHP: CPT

## 2025-07-22 PROCEDURE — 85610 PROTHROMBIN TIME: CPT | Mod: QW | Performed by: STUDENT IN AN ORGANIZED HEALTH CARE EDUCATION/TRAINING PROGRAM

## 2025-07-22 NOTE — PROGRESS NOTES
Patient identification verified with 2 identifiers.    Location: [facility] at [facility] - [facility] [id number] [address] [phone] option #1     Referring Physician: DR. SPRINGER  Enrollment/ Re-enrollment date: 10/15/25   INR Goal: 2.0-3.0  INR monitoring is per [facility] protocol.  Anticoagulation Medication: warfarin  Indication: FACTOR V LEIDEN    Subjective   Bleeding signs/symptoms: No    Bruising: No   Major bleeding event: No  Thrombosis signs/symptoms: No  Thromboembolic event: No  Missed doses: No  Extra doses: No  Medication changes: No  Dietary changes: No  Change in health: No  Change in activity: No  Alcohol: No  Other concerns: No    Upcoming Procedures:  Does the Patient Have any upcoming procedures that require interruption in anticoagulation therapy? no  Does the patient require bridging? no      Anticoagulation Summary  As of 2025      INR goal:  2.0-3.0   TTR:  77.5% (9.1 mo)   INR used for dosin.30 (2025)   Weekly warfarin total:  65 mg               Assessment/Plan   Therapeutic     1. New dose: no change    2. Next INR: 6 WEEKS PER ORDER      Education provided to patient during the visit:  Patient instructed to call in interim with questions, concerns and changes.   Patient educated on signs of bleeding/clotting.

## 2025-08-04 ENCOUNTER — TELEPHONE (OUTPATIENT)
Dept: PRIMARY CARE | Facility: CLINIC | Age: 60
End: 2025-08-04
Payer: COMMERCIAL

## 2025-08-13 ENCOUNTER — OFFICE VISIT (OUTPATIENT)
Dept: ORTHOPEDIC SURGERY | Facility: HOSPITAL | Age: 60
End: 2025-08-13
Payer: COMMERCIAL

## 2025-08-13 VITALS — BODY MASS INDEX: 23.8 KG/M2 | WEIGHT: 170 LBS | HEIGHT: 71 IN

## 2025-08-13 DIAGNOSIS — G57.31 PERONEAL NERVE PALSY, RIGHT: Primary | ICD-10-CM

## 2025-08-13 PROCEDURE — 99203 OFFICE O/P NEW LOW 30 MIN: CPT | Performed by: PHYSICIAN ASSISTANT

## 2025-08-13 PROCEDURE — 99212 OFFICE O/P EST SF 10 MIN: CPT

## 2025-08-13 PROCEDURE — 3008F BODY MASS INDEX DOCD: CPT | Performed by: PHYSICIAN ASSISTANT

## 2025-08-13 ASSESSMENT — PAIN - FUNCTIONAL ASSESSMENT: PAIN_FUNCTIONAL_ASSESSMENT: 0-10

## 2025-08-13 ASSESSMENT — PAIN SCALES - GENERAL: PAINLEVEL_OUTOF10: 0 - NO PAIN

## 2025-08-24 DIAGNOSIS — R35.0 BENIGN PROSTATIC HYPERPLASIA WITH URINARY FREQUENCY: ICD-10-CM

## 2025-08-24 DIAGNOSIS — N40.1 BENIGN PROSTATIC HYPERPLASIA WITH URINARY FREQUENCY: ICD-10-CM

## 2025-08-24 RX ORDER — OXYBUTYNIN CHLORIDE 5 MG/1
5 TABLET ORAL 3 TIMES DAILY
Qty: 270 TABLET | Refills: 1 | Status: SHIPPED | OUTPATIENT
Start: 2025-08-24 | End: 2025-10-23

## 2025-08-27 DIAGNOSIS — F31.75 BIPOLAR 1 DISORDER, DEPRESSED, PARTIAL REMISSION (MULTI): ICD-10-CM

## 2025-08-27 RX ORDER — LAMOTRIGINE 150 MG/1
150 TABLET ORAL 2 TIMES DAILY
Qty: 180 TABLET | Refills: 3 | Status: SHIPPED | OUTPATIENT
Start: 2025-08-27

## 2025-09-02 ENCOUNTER — ANTICOAGULATION - WARFARIN VISIT (OUTPATIENT)
Dept: CARDIOLOGY | Facility: CLINIC | Age: 60
End: 2025-09-02
Payer: COMMERCIAL

## 2025-09-02 DIAGNOSIS — D68.51 HOMOZYGOUS FACTOR V LEIDEN MUTATION (MULTI): Primary | ICD-10-CM

## 2025-09-02 LAB
POC INR: 1.9 (ref 0.9–1.1)
POC PROTHROMBIN TIME: ABNORMAL (ref 9.3–12.5)

## 2025-09-02 PROCEDURE — 85610 PROTHROMBIN TIME: CPT | Mod: QW | Performed by: STUDENT IN AN ORGANIZED HEALTH CARE EDUCATION/TRAINING PROGRAM

## 2025-09-02 PROCEDURE — 99211 OFF/OP EST MAY X REQ PHY/QHP: CPT

## 2026-03-27 ENCOUNTER — APPOINTMENT (OUTPATIENT)
Dept: OPHTHALMOLOGY | Facility: CLINIC | Age: 61
End: 2026-03-27
Payer: COMMERCIAL